# Patient Record
Sex: FEMALE | Race: WHITE | NOT HISPANIC OR LATINO | Employment: OTHER | URBAN - METROPOLITAN AREA
[De-identification: names, ages, dates, MRNs, and addresses within clinical notes are randomized per-mention and may not be internally consistent; named-entity substitution may affect disease eponyms.]

---

## 2017-04-16 ENCOUNTER — HOSPITAL ENCOUNTER (EMERGENCY)
Facility: MEDICAL CENTER | Age: 47
End: 2017-04-16
Attending: EMERGENCY MEDICINE
Payer: MEDICARE

## 2017-04-16 VITALS
BODY MASS INDEX: 29.95 KG/M2 | TEMPERATURE: 97.3 F | RESPIRATION RATE: 14 BRPM | OXYGEN SATURATION: 96 % | WEIGHT: 169 LBS | HEART RATE: 87 BPM | DIASTOLIC BLOOD PRESSURE: 60 MMHG | SYSTOLIC BLOOD PRESSURE: 114 MMHG | HEIGHT: 63 IN

## 2017-04-16 DIAGNOSIS — K04.7 DENTAL INFECTION: ICD-10-CM

## 2017-04-16 PROCEDURE — A9270 NON-COVERED ITEM OR SERVICE: HCPCS | Performed by: EMERGENCY MEDICINE

## 2017-04-16 PROCEDURE — 700102 HCHG RX REV CODE 250 W/ 637 OVERRIDE(OP): Performed by: EMERGENCY MEDICINE

## 2017-04-16 PROCEDURE — 99283 EMERGENCY DEPT VISIT LOW MDM: CPT

## 2017-04-16 RX ORDER — AMOXICILLIN 500 MG/1
500 TABLET, FILM COATED ORAL 3 TIMES DAILY
Qty: 30 TAB | Refills: 0 | Status: SHIPPED | OUTPATIENT
Start: 2017-04-16 | End: 2017-04-26

## 2017-04-16 RX ORDER — AMOXICILLIN 500 MG/1
500 CAPSULE ORAL ONCE
Status: COMPLETED | OUTPATIENT
Start: 2017-04-16 | End: 2017-04-16

## 2017-04-16 RX ADMIN — AMOXICILLIN 500 MG: 500 CAPSULE ORAL at 12:31

## 2017-04-16 NOTE — ED NOTES
Pt discharged to home. Pt and family understand written and verbal d/c instructions.  Prescription given.  Pt to follow up with [facility].  Pt verbalized understanding.

## 2017-04-16 NOTE — ED AVS SNAPSHOT
Selligy Access Code: Activation code not generated  Current Selligy Status: Active    Whydhart  A secure, online tool to manage your health information     [employer]s Selligy® is a secure, online tool that connects you to your personalized health information from the privacy of your home -- day or night - making it very easy for you to manage your healthcare. Once the activation process is completed, you can even access your medical information using the Selligy molly, which is available for free in the Apple Molly store or Google Play store.     Selligy provides the following levels of access (as shown below):   My Chart Features   [facility] Primary Care Doctor [facility]  Specialists [facility]  Urgent  Care Non-[facility]  Primary Care  Doctor   Email your healthcare team securely and privately 24/7 X X X X   Manage appointments: schedule your next appointment; view details of past/upcoming appointments X      Request prescription refills. X      View recent personal medical records, including lab and immunizations X X X X   View health record, including health history, allergies, medications X X X X   Read reports about your outpatient visits, procedures, consult and ER notes X X X X   See your discharge summary, which is a recap of your hospital and/or ER visit that includes your diagnosis, lab results, and care plan. X X       How to register for Selligy:  1. Go to  [email or web address].  2. Click on the Sign Up Now box, which takes you to the New Member Sign Up page. You will need to provide the following information:  a. Enter your Selligy Access Code exactly as it appears at the top of this page. (You will not need to use this code after you’ve completed the sign-up process. If you do not sign up before the expiration date, you must request a new code.)   b. Enter your date of birth.   c. Enter your home email address.   d. Click Submit, and follow the next screen’s instructions.  3. Create a Selligy ID. This will  be your [employer]ia login ID and cannot be changed, so think of one that is secure and easy to remember.  4. Create a Inporia password. You can change your password at any time.  5. Enter your Password Reset Question and Answer. This can be used at a later time if you forget your password.   6. Enter your e-mail address. This allows you to receive e-mail notifications when new information is available in Inporia.  7. Click Sign Up. You can now view your health information.    For assistance activating your Inporia account, call [phone]

## 2017-04-16 NOTE — DISCHARGE INSTRUCTIONS
Abscessed Tooth  An abscessed tooth is an infection around your tooth. It may be caused by holes or damage to the tooth (cavity) or a dental disease. An abscessed tooth causes mild to very bad pain in and around the tooth. See your dentist right away if you have tooth or gum pain.  HOME CARE  · Take your medicine as told. Finish it even if you start to feel better.  · Do not drive after taking pain medicine.  · Rinse your mouth (gargle) often with salt water (¼ teaspoon salt in 8 ounces of warm water).  · Do not apply heat to the outside of your face.  GET HELP RIGHT AWAY IF:   · You have a temperature by mouth above 102° F (38.9° C), not controlled by medicine.  · You have chills and a very bad headache.  · You have problems breathing or swallowing.  · Your mouth will not open.  · You develop puffiness (swelling) on the neck or around the eye.  · Your pain is not helped by medicine.  · Your pain is getting worse instead of better.  MAKE SURE YOU:   · Understand these instructions.  · Will watch your condition.  · Will get help right away if you are not doing well or get worse.  Document Released: 06/05/2009 Document Revised: 03/11/2013 Document Reviewed: 03/27/2012  [employer]® Patient Information ©2014 [employer], [email or web address].

## 2017-04-16 NOTE — ED PROVIDER NOTES
"ED Provider Note      CHIEF COMPLAINT  Chief Complaint   Patient presents with   • Dental Pain       HPI  Karrie Ann Riedel is a 46 y.o. female who presents with dental pain. Onset was yesterday.   The pain is moderate, gradual onset, and located in the left upper molar with crown.  The pain is worse with chewing.   The patient denies difficulty breathing or swallowing.  Patient states she recently moved to town and is still searching for Bk.  She has associated left-sided facial swelling.      REVIEW OF SYSTEMS    Constitutional: No fever.  Respiratory: No difficulty breathing   Ear nose throat: Dental pain         PAST MEDICAL HISTORY  Past Medical History   Diagnosis Date   • Knee pain    • Chiari malformation    • LBP RADIATING TO BOTH LEGS    • Hand swelling    • Hyponatremia    • Hepatitis A 1984   • Heart burn    • Indigestion    • Dental disorder      PARTIAL UPPER PLATE    • Bronchitis 2011   • Anesthesia      \"Takes long time to wake up\"   • Breath shortness      Since 2011   • Personality disorder      anxiety   • ADHD (attention deficit hyperactivity disorder)    • Bronchitis    • Arthritis      R knee & L hip    • Anxiety    • Depression        FAMILY HISTORY  Family History   Problem Relation Age of Onset   • Psychiatry Mother    • Hypertension Father    • Diabetes Father    • Cancer Maternal Grandfather    • Heart Disease Paternal Grandmother    • Diabetes Paternal Grandfather        SOCIAL HISTORY  Social History     Social History   • Marital Status: Single     Spouse Name: N/A   • Number of Children: N/A   • Years of Education: N/A     Social History Main Topics   • Smoking status: Current Every Day Smoker -- 0.50 packs/day for 19 years     Types: Cigarettes     Last Attempt to Quit: 08/09/2012   • Smokeless tobacco: Former User     Quit date: 01/01/1990      Comment: 2 TO 0.5 PPD  FOR 28 YEARS     • Alcohol Use: No      Comment: QUIT 5 YEARS AGO    • Drug Use: No      Comment: MARIJUANA 1 " "JOINT PER WEEK  last dose 2 days    • Sexual Activity:     Partners: Female     Other Topics Concern   • Not on file     Social History Narrative       SURGICAL HISTORY  Past Surgical History   Procedure Laterality Date   • Ear middle exploration     • Tubal coagulation laparoscopic bilateral     • Knee arthroscopy     • Ankle orif  7/23/2010     Performed by NATHALIE MCCLURE at SURGERY [facility]   • Orthopedic osteotomy  3/24/2011     Performed by PONCHO NORTON at SURGERY [facility] ORS   • Hardware removal ortho       left ankle   • Hardware removal ortho  7/5/2012     Performed by PONCHO NORTON at SURGERY [facility]   • Gastroscopy-endo  8/17/2012     Performed by VEE REDMOND at ENDOSCOPY [facility]       CURRENT MEDICATIONS  Home Medications     **Home medications have not yet been reviewed for this encounter**          ALLERGIES  Allergies   Allergen Reactions   • Aspirin Hives     hives   • Nsaids Hives     Hives-ALL EXCEPT TYLENOL   • Toradol Hives   • Bactrim [Sulfamethoxazole W-Trimethoprim]    • Clindamycin    • Haldol [Haloperidol Lactate]    • Other Environmental      Pollen-hayfever       PHYSICAL EXAM  VITAL SIGNS: /60 mmHg  Pulse 87  Temp(Src) 36.3 °C (97.3 °F)  Resp 14  Ht 1.6 m (5' 2.99\")  Wt 76.658 kg (169 lb)  BMI 29.94 kg/m2  SpO2 96%   Constitutional:  Non-toxic appearance.   HENT: Slight separation of crown from tooth left upper dentition, minimal gingival swelling.  No pustule or purulent drainage.  Posterior pharynx normal  Eyes: Anicteric, no conjunctivitis.     Neurologic: Speech is clear, follows commands, facial expression is symmetrical.  Psychiatric: Affect normal,  Mood normal.   Musculoskeletal: Neck nontender      COURSE & MEDICAL DECISION MAKING  Pertinent Labs & Imaging studies reviewed. (See chart for details)  Patient with dental pain, mild left-sided facial swelling consistent with dental infection.  Plan for amoxicillin.  She " initially requested clindamycin however, after being informed she was allergic to clindamycin, agreed and stated she had not had a problem with amoxicillin the past.  Patient has refused pain medication.  She is provided a dental referral form.    FINAL IMPRESSION  1. Dental infection               Electronically signed by: Christiano Eng, 4/16/2017 4:30 PM

## 2017-04-16 NOTE — ED AVS SNAPSHOT
Home Care Instructions                                                                                                                Karrie Ann Riedel   MRN: [id number]    Department:  [facility], Emergency Dept   Date of Visit:  4/16/2017            [facility], Emergency Dept    [address] [address]    Phone:  [phone]      You were seen by     Christiano Eng M.D.      Your Diagnosis Was     Dental infection     K04.7       These are the medications you received during your hospitalization from 04/16/2017 1150 to 04/16/2017 1251     Date/Time Order Dose Route Action    04/16/2017 1231 amoxicillin (AMOXIL) capsule 500 mg 500 mg Oral Given      Follow-up Information     1. Schedule an appointment as soon as possible for a visit with [facility].    Why:  see a dentist as soon as possible    Contact information    [address] [place] [id number] [phone]      Medication Information     Review all of your home medications and newly ordered medications with your primary doctor and/or pharmacist as soon as possible. Follow medication instructions as directed by your doctor and/or pharmacist.     Please keep your complete medication list with you and share with your physician. Update the information when medications are discontinued, doses are changed, or new medications (including over-the-counter products) are added; and carry medication information at all times in the event of emergency situations.               Medication List      START taking these medications        Instructions    Morning Afternoon Evening Bedtime    Amoxicillin 500 MG Tabs        Take 1 Tab by mouth 3 times a day for 10 days.   Dose:  500 mg                          ASK your doctor about these medications        Instructions    Morning Afternoon Evening Bedtime    albuterol 108 (90 BASE) MCG/ACT Aers inhalation aerosol   Commonly known as:  PROAIR  HFA        Inhale 2 Puffs by mouth every 6 hours as needed for Shortness of Breath.   Dose:  2 Puff                        carisoprodol 350 MG Tabs   Commonly known as:  SOMA        Take 350 mg by mouth 3 times a day.   Dose:  350 mg                        fluoxetine 20 MG Caps   Commonly known as:  PROZAC        Take 1 Cap by mouth every day.   Dose:  20 mg                        lurasidone 40 MG Tabs   Commonly known as:  LATUDA        Take 1 Tab by mouth with dinner.   Dose:  40 mg                        methadone 10 MG Tabs   Commonly known as:  DOLOPHINE        Take 10 mg by mouth 3 times a day.   Dose:  10 mg                             Where to Get Your Medications      You can get these medications from any pharmacy     Bring a paper prescription for each of these medications    - Amoxicillin 500 MG Tabs              Discharge Instructions       Abscessed Tooth  An abscessed tooth is an infection around your tooth. It may be caused by holes or damage to the tooth (cavity) or a dental disease. An abscessed tooth causes mild to very bad pain in and around the tooth. See your dentist right away if you have tooth or gum pain.  HOME CARE  · Take your medicine as told. Finish it even if you start to feel better.  · Do not drive after taking pain medicine.  · Rinse your mouth (gargle) often with salt water (¼ teaspoon salt in 8 ounces of warm water).  · Do not apply heat to the outside of your face.  GET HELP RIGHT AWAY IF:   · You have a temperature by mouth above 102° F (38.9° C), not controlled by medicine.  · You have chills and a very bad headache.  · You have problems breathing or swallowing.  · Your mouth will not open.  · You develop puffiness (swelling) on the neck or around the eye.  · Your pain is not helped by medicine.  · Your pain is getting worse instead of better.  MAKE SURE YOU:   · Understand these instructions.  · Will watch your condition.  · Will get help right away if you are not doing well or  get worse.  Document Released: 06/05/2009 Document Revised: 03/11/2013 Document Reviewed: 03/27/2012  ExitCare® Patient Information ©2014 [employer].            Patient Information     Patient Information    Following emergency treatment: all patient requiring follow-up care must return either to a private physician or a clinic if your condition worsens before you are able to obtain further medical attention, please return to the emergency room.     Billing Information    At [facility], we work to make the billing process streamlined for our patients.  Our Representatives are here to answer any questions you may have regarding your hospital bill.  If you have insurance coverage and have supplied your insurance information to us, we will submit a claim to your insurer on your behalf.  Should you have any questions regarding your bill, we can be reached online or by phone as follows:  Online: You are able pay your bills online or live chat with our representatives about any billing questions you may have. We are here to help Monday - Friday from 8:00am to 7:30pm and 9:00am - 12:00pm on Saturdays.  Please visit [email or web address]  for more information.   Phone:  [phone] or [phone]    Please note that your emergency physician, surgeon, pathologist, radiologist, anesthesiologist, and other specialists are not employed by [facility] and will therefore bill separately for their services.  Please contact them directly for any questions concerning their bills at the numbers below:     Emergency Physician Services:  [phone]  [facility]:  [phone]  [facility]:  [phone]  [facility]:  [phone]    1. Your final bill may vary from the amount quoted upon discharge if all procedures are not complete at that time, or if your doctor has additional procedures of which we are not aware. You will receive an additional  bill if you return to the Emergency Department at [facility] for suture removal regardless of the facility of which the sutures were placed.     2. Please arrange for settlement of this account at the emergency registration.    3. All self-pay accounts are due in full at the time of treatment.  If you are unable to meet this obligation then payment is expected within 4-5 days.     4. If you have had radiology studies (CT, X-ray, Ultrasound, MRI), you have received a preliminary result during your emergency department visit. Please contact the radiology department [phone] to receive a copy of your final result. Please discuss the Final result with your primary physician or with the follow up physician provided.     Crisis Hotline:  [place] Crisis Hotline:  7-235-DWROMAW or 1-703.797.9074  [place] Crisis Hotline:    1-496.673.1977 or 253-430-5432         ED Discharge Follow Up Questions    1. In order to provide you with very good care, we would like to follow up with a phone call in the next few days.  May we have your permission to contact you?     YES /  NO    2. What is the best phone number to call you? (       )_____-__________    3. What is the best time to call you?      Morning  /  Afternoon  /  Evening                   Patient Signature:  ____________________________________________________________    Date:  ____________________________________________________________

## 2017-04-16 NOTE — ED AVS SNAPSHOT
4/16/2017    Karrie Ann Riedel  [address] [place], [place] [address]  [place] [address]    Dear Lauren:    [facility] wants to ensure your discharge home is safe and you or your loved ones have had all of your questions answered regarding your care after you leave the hospital.    Below is a list of resources and contact information should you have any questions regarding your hospital stay, follow-up instructions, or active medical symptoms.    Questions or Concerns Regarding… Contact   Medical Questions Related to Your Discharge  (7 days a week, 8am-5pm) Contact a Nurse Care Coordinator   [phone]   Medical Questions Not Related to Your Discharge  (24 hours a day / 7 days a week)  Contact the Nurse Health Line   [phone]    Medications or Discharge Instructions Refer to your discharge packet   or contact your [facility] Primary Care Provider   [phone]   Follow-up Appointment(s) Schedule your appointment via [employer]   or contact Scheduling [phone]   Billing Review your statement via [employer]  or contact Billing [phone]   Medical Records Review your records via [employer]   or contact Medical Records [phone]     You may receive a telephone call within two days of discharge. This call is to make certain you understand your discharge instructions and have the opportunity to have any questions answered. You can also easily access your medical information, test results and upcoming appointments via the [employer] free online health management tool. You can learn more and sign up at [email or web address]. For assistance setting up your [employer] account, please call [phone].    Once again, we want to ensure your discharge home is safe and that you have a clear understanding of any next steps in your care. If you have any questions or concerns, please do not hesitate to contact us, we are here for you. Thank you for choosing [facility] for your healthcare needs.    Sincerely,    Your [facility] Healthcare Team

## 2017-04-16 NOTE — ED NOTES
Chief Complaint   Patient presents with   • Dental Pain       Pt ambulatory to rm 59.  Pt c/o left upper facial swelling X 1 day.  Pain X 2 days (6/10).  No drainage.  Pt has an exposed crown and believes food may have gotten stuck in it yesterday..  Chart up for ERP.

## 2017-05-10 ENCOUNTER — HOSPITAL ENCOUNTER (EMERGENCY)
Facility: MEDICAL CENTER | Age: 47
End: 2017-05-10
Attending: EMERGENCY MEDICINE
Payer: MEDICARE

## 2017-05-10 VITALS
WEIGHT: 177.25 LBS | RESPIRATION RATE: 16 BRPM | OXYGEN SATURATION: 100 % | HEART RATE: 98 BPM | HEIGHT: 63 IN | DIASTOLIC BLOOD PRESSURE: 55 MMHG | TEMPERATURE: 98.2 F | BODY MASS INDEX: 31.41 KG/M2 | SYSTOLIC BLOOD PRESSURE: 136 MMHG
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
## 2017-05-10 PROCEDURE — 99282 EMERGENCY DEPT VISIT SF MDM: CPT

## 2017-05-10 RX ORDER — HYDROXYZINE HYDROCHLORIDE 25 MG/1
25 TABLET, FILM COATED ORAL EVERY 6 HOURS PRN
Qty: 30 TAB | Refills: 0 | Status: SHIPPED | OUTPATIENT
Start: 2017-05-10 | End: 2017-05-26

## 2017-05-10 RX ORDER — PERMETHRIN 50 MG/G
CREAM TOPICAL
Qty: 1 TUBE | Refills: 0 | Status: SHIPPED | OUTPATIENT
Start: 2017-05-10 | End: 2017-05-26

## 2017-05-10 ASSESSMENT — PAIN SCALES - GENERAL: PAINLEVEL_OUTOF10: 0

## 2017-05-10 ASSESSMENT — LIFESTYLE VARIABLES: DO YOU DRINK ALCOHOL: NO

## 2017-05-10 NOTE — ED AVS SNAPSHOT
Home Care Instructions                                                                                                                Karrie Ann Riedel   MRN: [id number]    Department:  [facility], Emergency Dept   Date of Visit:  5/10/2017            [facility], Emergency Dept    [address] [address]    Phone:  [phone]      You were seen by     Bennett Medina M.D.      Your Diagnosis Was     Bed bug bite, initial encounter     W57.XXXA       Follow-up Information     1. Schedule an appointment as soon as possible for a visit with DIPTI Gutierrez.    Specialty:  Family Medicine    Why:  for blood pressure management    Contact information    [address]  [address]  [facility] [address] [phone]          2. Follow up with [facility], Emergency Dept.    Specialty:  Emergency Medicine    Why:  If symptoms worsen    Contact information    [address] [facility] [address] [phone]      Medication Information     Review all of your home medications and newly ordered medications with your primary doctor and/or pharmacist as soon as possible. Follow medication instructions as directed by your doctor and/or pharmacist.     Please keep your complete medication list with you and share with your physician. Update the information when medications are discontinued, doses are changed, or new medications (including over-the-counter products) are added; and carry medication information at all times in the event of emergency situations.               Medication List      START taking these medications        Instructions    Morning Afternoon Evening Bedtime    hydrOXYzine 25 MG Tabs   Commonly known as:  ATARAX        Take 1 Tab by mouth every 6 hours as needed for Itching.   Dose:  25 mg                        permethrin 5 % Crea   Commonly known as:  ELIMITE        Apply to entire body once, rinse 12 hours later.                          ASK your doctor about these medications        Instructions    Morning Afternoon Evening Bedtime    albuterol 108 (90 BASE) MCG/ACT Aers inhalation aerosol   Commonly known as:  PROAIR HFA        Inhale 2 Puffs by mouth every 6 hours as needed for Shortness of Breath.   Dose:  2 Puff                        carisoprodol 350 MG Tabs   Commonly known as:  SOMA        Take 350 mg by mouth 3 times a day.   Dose:  350 mg                        fluoxetine 20 MG Caps   Commonly known as:  PROZAC        Take 1 Cap by mouth every day.   Dose:  20 mg                        lurasidone 40 MG Tabs   Commonly known as:  LATUDA        Take 1 Tab by mouth with dinner.   Dose:  40 mg                        methadone 10 MG Tabs   Commonly known as:  DOLOPHINE        Take 10 mg by mouth 3 times a day.   Dose:  10 mg                             Where to Get Your Medications      You can get these medications from any pharmacy     Bring a paper prescription for each of these medications    - hydrOXYzine 25 MG Tabs  - permethrin 5 % Crea              Discharge Instructions       Bedbugs     Bedbugs are tiny bugs that live in and around beds. They stay hidden during the day, and they come out at night and bite. Bedbugs need blood to live and grow.   WHERE ARE BEDBUGS FOUND?   Bedbugs can be found anywhere, whether a place is clean or dirty. They are most often found in places where many people come and go, such as hotels, shelters, dorms, and health care settings. It is also common for them to be found in homes where there are many birds or bats nearby.   WHAT ARE BEDBUG BITES LIKE?   A bedbug bite leaves a small red bump with a darker red dot in the middle. The bump may appear soon after a person is bitten or a day or more later. Bedbug bites usually do not hurt, but they may itch.   Most people do not need treatment for bedbug bites. The bumps usually go away on their own in a few days.   HOW DO I CHECK FOR BEDBUGS?      Bedbugs are reddish-brown, oval, and flat. They range in size from 1 mm to 7 mm and they cannot fly. Look for bedbugs in these places:   On mattresses, bed frames, headboards, and box springs.   On drapes and curtains in bedrooms.   Under carpeting in bedrooms.   Behind electrical outlets.   Behind any wallpaper that is peeling.   Inside luggage.  Also look for black or red spots or stains on or near the bed. Stains can come from bedbugs that have been crushed or from bedbug waste.   WHAT SHOULD I DO IF I FIND BEDBUGS?   When Traveling   If you find bedbugs while traveling, check all of your possessions carefully before you bring them into your home. Consider throwing away anything that has bedbugs on it.   At Home   If you find bedbugs at home, your bedroom may need to be treated by a pest control expert. You may also need to throw away mattresses or luggage. To help keep bedbugs from coming back, consider taking these actions:   Put a plastic cover over your mattress.   Wash your clothes and bedding in water that is hotter than 120°F (48.9°C) and dry them on a hot setting. Bedbugs are killed by high temperatures.   Vacuum often around the bed and in all of the cracks and crevices where the bugs might hide.   Carefully check all used furniture, bedding, or clothes that you bring into your home.   Eliminate bird nests and bat roosts that are near your home.  In Your Bed   If you find bedbugs in your bed, consider wearing pajamas that have long sleeves and pant legs. Bedbugs usually bite areas of the skin that are not covered.   This information is not intended to replace advice given to you by your health care provider. Make sure you discuss any questions you have with your health care provider.   Document Released: 01/20/2012 Document Revised: 05/03/2016 Document Reviewed: 12/14/2015   Elsevier Interactive Patient Education ©2016 [employer]vier Inc[employer]       Discharge References/Attachments     BEDBUGS (ENGLISH)             Patient Information     Patient Information    Following emergency treatment: all patient requiring follow-up care must return either to a private physician or a clinic if your condition worsens before you are able to obtain further medical attention, please return to the emergency room.     Billing Information    At [facility], we work to make the billing process streamlined for our patients.  Our Representatives are here to answer any questions you may have regarding your hospital bill.  If you have insurance coverage and have supplied your insurance information to us, we will submit a claim to your insurer on your behalf.  Should you have any questions regarding your bill, we can be reached online or by phone as follows:  Online: You are able pay your bills online or live chat with our representatives about any billing questions you may have. We are here to help Monday - Friday from 8:00am to 7:30pm and 9:00am - 12:00pm on Saturdays.  Please visit [email or web address]  for more information.   Phone:  [phone] or [phone]    Please note that your emergency physician, surgeon, pathologist, radiologist, anesthesiologist, and other specialists are not employed by [facility] and will therefore bill separately for their services.  Please contact them directly for any questions concerning their bills at the numbers below:     Emergency Physician Services:  [phone]  [facility]:  [phone]  [facility]:  [phone]  [facility]:  [phone]    1. Your final bill may vary from the amount quoted upon discharge if all procedures are not complete at that time, or if your doctor has additional procedures of which we are not aware. You will receive an additional bill if you return to the Emergency Department at [facility] for suture removal regardless of the facility of which the sutures were placed.     2. Please  arrange for settlement of this account at the emergency registration.    3. All self-pay accounts are due in full at the time of treatment.  If you are unable to meet this obligation then payment is expected within 4-5 days.     4. If you have had radiology studies (CT, X-ray, Ultrasound, MRI), you have received a preliminary result during your emergency department visit. Please contact the radiology department [phone] to receive a copy of your final result. Please discuss the Final result with your primary physician or with the follow up physician provided.     Crisis Hotline:  [place] Crisis Hotline:  8-454-SWFOMMK or 1-997.166.4103  [place] Crisis Hotline:    1-544.495.7622 or 873-429-0340         ED Discharge Follow Up Questions    1. In order to provide you with very good care, we would like to follow up with a phone call in the next few days.  May we have your permission to contact you?     YES /  NO    2. What is the best phone number to call you? (       )_____-__________    3. What is the best time to call you?      Morning  /  Afternoon  /  Evening                   Patient Signature:  ____________________________________________________________    Date:  ____________________________________________________________

## 2017-05-10 NOTE — ED AVS SNAPSHOT
5/10/2017    Karrie Ann Riedel  [address] [place], [place] [address]  [place] [address]    Dear Lauren:    [facility] wants to ensure your discharge home is safe and you or your loved ones have had all of your questions answered regarding your care after you leave the hospital.    Below is a list of resources and contact information should you have any questions regarding your hospital stay, follow-up instructions, or active medical symptoms.    Questions or Concerns Regarding… Contact   Medical Questions Related to Your Discharge  (7 days a week, 8am-5pm) Contact a Nurse Care Coordinator   [phone]   Medical Questions Not Related to Your Discharge  (24 hours a day / 7 days a week)  Contact the Nurse Health Line   [phone]    Medications or Discharge Instructions Refer to your discharge packet   or contact your [facility] Primary Care Provider   [phone]   Follow-up Appointment(s) Schedule your appointment via [facility]   or contact Scheduling [phone]   Billing Review your statement via [facility]  or contact Billing [phone]   Medical Records Review your records via [facility]   or contact Medical Records [phone]     You may receive a telephone call within two days of discharge. This call is to make certain you understand your discharge instructions and have the opportunity to have any questions answered. You can also easily access your medical information, test results and upcoming appointments via the [facility] free online health management tool. You can learn more and sign up at [employer]. For assistance setting up your [facility] account, please call [phone].    Once again, we want to ensure your discharge home is safe and that you have a clear understanding of any next steps in your care. If you have any questions or concerns, please do not hesitate to contact us, we are here for you. Thank you for choosing [facility] for your healthcare needs.    Sincerely,    Your [facility] Healthcare Team

## 2017-05-10 NOTE — ED AVS SNAPSHOT
Web Designed Rooms Access Code: Activation code not generated  Current Web Designed Rooms Status: Active    [employer]  A secure, online tool to manage your health information     [employer]’s Web Designed Rooms® is a secure, online tool that connects you to your personalized health information from the privacy of your home -- day or night - making it very easy for you to manage your healthcare. Once the activation process is completed, you can even access your medical information using the Web Designed Rooms molly, which is available for free in the Apple Molly store or Google Play store.     Web Designed Rooms provides the following levels of access (as shown below):   My Chart Features   [facility] Primary Care Doctor [facility]  Specialists [facility]  Urgent  Care Non-[facility]  Primary Care  Doctor   Email your healthcare team securely and privately 24/7 X X X X   Manage appointments: schedule your next appointment; view details of past/upcoming appointments X      Request prescription refills. X      View recent personal medical records, including lab and immunizations X X X X   View health record, including health history, allergies, medications X X X X   Read reports about your outpatient visits, procedures, consult and ER notes X X X X   See your discharge summary, which is a recap of your hospital and/or ER visit that includes your diagnosis, lab results, and care plan. X X       How to register for Web Designed Rooms:  1. Go to  [email or web address].  2. Click on the Sign Up Now box, which takes you to the New Member Sign Up page. You will need to provide the following information:  a. Enter your Web Designed Rooms Access Code exactly as it appears at the top of this page. (You will not need to use this code after you’ve completed the sign-up process. If you do not sign up before the expiration date, you must request a new code.)   b. Enter your date of birth.   c. Enter your home email address.   d. Click Submit, and follow the next screen’s instructions.  3. Create a Web Designed Rooms ID. This will  be your [employer] login ID and cannot be changed, so think of one that is secure and easy to remember.  4. Create a [employer] password. You can change your password at any time.  5. Enter your Password Reset Question and Answer. This can be used at a later time if you forget your password.   6. Enter your e-mail address. This allows you to receive e-mail notifications when new information is available in [employer].  7. Click Sign Up. You can now view your health information.    For assistance activating your [employer] account, call [phone]

## 2017-05-11 ENCOUNTER — HOSPITAL ENCOUNTER (EMERGENCY)
Facility: MEDICAL CENTER | Age: 47
End: 2017-05-11
Attending: EMERGENCY MEDICINE
Payer: MEDICARE

## 2017-05-11 VITALS
RESPIRATION RATE: 16 BRPM | OXYGEN SATURATION: 95 % | BODY MASS INDEX: 31.54 KG/M2 | SYSTOLIC BLOOD PRESSURE: 104 MMHG | HEIGHT: 63 IN | DIASTOLIC BLOOD PRESSURE: 60 MMHG | TEMPERATURE: 97.9 F | HEART RATE: 64 BPM | WEIGHT: 178 LBS

## 2017-05-11 DIAGNOSIS — S61.411A LACERATION OF RIGHT HAND WITHOUT FOREIGN BODY, INITIAL ENCOUNTER: ICD-10-CM

## 2017-05-11 PROCEDURE — 304217 HCHG IRRIGATION SYSTEM

## 2017-05-11 PROCEDURE — 99283 EMERGENCY DEPT VISIT LOW MDM: CPT

## 2017-05-11 PROCEDURE — 304999 HCHG REPAIR-SIMPLE/INTERMED LEVEL 1

## 2017-05-11 PROCEDURE — 700101 HCHG RX REV CODE 250

## 2017-05-11 PROCEDURE — A6403 STERILE GAUZE>16 <= 48 SQ IN: HCPCS

## 2017-05-11 PROCEDURE — 303747 HCHG EXTRA SUTURE

## 2017-05-11 RX ORDER — LIDOCAINE HYDROCHLORIDE AND EPINEPHRINE 10; 10 MG/ML; UG/ML
5 INJECTION, SOLUTION INFILTRATION; PERINEURAL ONCE
Status: COMPLETED | OUTPATIENT
Start: 2017-05-11 | End: 2017-05-11

## 2017-05-11 RX ORDER — LIDOCAINE HYDROCHLORIDE AND EPINEPHRINE 10; 10 MG/ML; UG/ML
INJECTION, SOLUTION INFILTRATION; PERINEURAL
Status: COMPLETED
Start: 2017-05-11 | End: 2017-05-11

## 2017-05-11 RX ADMIN — LIDOCAINE HYDROCHLORIDE AND EPINEPHRINE 5 ML: 10; 10 INJECTION, SOLUTION INFILTRATION; PERINEURAL at 09:45

## 2017-05-11 RX ADMIN — LIDOCAINE HYDROCHLORIDE,EPINEPHRINE BITARTRATE 5 ML: 10; .01 INJECTION, SOLUTION INFILTRATION; PERINEURAL at 09:45

## 2017-05-11 ASSESSMENT — LIFESTYLE VARIABLES: DO YOU DRINK ALCOHOL: NO

## 2017-05-11 ASSESSMENT — PAIN SCALES - GENERAL: PAINLEVEL_OUTOF10: 4

## 2017-05-11 NOTE — ED NOTES
Verbalizes understanding of discharge and followup instructions. Given Rx's x2. Ambulates with steady gait to discharge.

## 2017-05-11 NOTE — ED AVS SNAPSHOT
5/11/2017    Karrie Ann Riedel  [place] [address]    Dear Lauren:    [facility] wants to ensure your discharge home is safe and you or your loved ones have had all of your questions answered regarding your care after you leave the hospital.    Below is a list of resources and contact information should you have any questions regarding your hospital stay, follow-up instructions, or active medical symptoms.    Questions or Concerns Regarding… Contact   Medical Questions Related to Your Discharge  (7 days a week, 8am-5pm) Contact a Nurse Care Coordinator   [phone]   Medical Questions Not Related to Your Discharge  (24 hours a day / 7 days a week)  Contact the Nurse Health Line   [phone]    Medications or Discharge Instructions Refer to your discharge packet   or contact your [facility] Primary Care Provider   [phone]   Follow-up Appointment(s) Schedule your appointment via [employer]   or contact Scheduling [phone]   Billing Review your statement via [employer]  or contact Billing [phone]   Medical Records Review your records via [employer]   or contact Medical Records [phone]     You may receive a telephone call within two days of discharge. This call is to make certain you understand your discharge instructions and have the opportunity to have any questions answered. You can also easily access your medical information, test results and upcoming appointments via the [employer] free online health management tool. You can learn more and sign up at [employer]. For assistance setting up your [employer] account, please call [phone].    Once again, we want to ensure your discharge home is safe and that you have a clear understanding of any next steps in your care. If you have any questions or concerns, please do not hesitate to contact us, we are here for you. Thank you for choosing [facility] for your healthcare needs.    Sincerely,    Your [facility] Healthcare Team

## 2017-05-11 NOTE — ED AVS SNAPSHOT
Home Care Instructions                                                                                                                Karrie Ann Riedel   MRN: [id number]    Department:  [facility], Emergency Dept   Date of Visit:  5/11/2017            [facility], Emergency Dept    [address] [place] [id number]    Phone:  [phone]      You were seen by     Rajwinder Hightower M.D.      Your Diagnosis Was     Laceration of right hand without foreign body, initial encounter     S61.411A       These are the medications you received during your hospitalization from 05/11/2017 0931 to 05/11/2017 1035     Date/Time Order Dose Route Action    05/11/2017 0945 lidocaine-epinephrine 1 %-1:110430 injection 5 mL 5 mL Infiltration Given      Follow-up Information     1. Follow up with DIPTI Gutierrez.    Specialty:  Family Medicine    Why:  For suture removal in 7-10 days    Contact information    [address]  [address]  [facility] [address] [phone]          2. Follow up with [facility], Emergency Dept.    Specialty:  Emergency Medicine    Why:  Return for worsening pain, redness, swelling, fever or other concerns    Contact information    [address] [facility] [address] [phone]      Medication Information     Review all of your home medications and newly ordered medications with your primary doctor and/or pharmacist as soon as possible. Follow medication instructions as directed by your doctor and/or pharmacist.     Please keep your complete medication list with you and share with your physician. Update the information when medications are discontinued, doses are changed, or new medications (including over-the-counter products) are added; and carry medication information at all times in the event of emergency situations.               Medication List      ASK your doctor about these medications        Instructions    Morning Afternoon  Evening Bedtime    albuterol 108 (90 BASE) MCG/ACT Aers inhalation aerosol   Commonly known as:  PROAIR HFA        Inhale 2 Puffs by mouth every 6 hours as needed for Shortness of Breath.   Dose:  2 Puff                        carisoprodol 350 MG Tabs   Commonly known as:  SOMA        Take 350 mg by mouth 3 times a day.   Dose:  350 mg                        fluoxetine 20 MG Caps   Commonly known as:  PROZAC        Take 1 Cap by mouth every day.   Dose:  20 mg                        hydrOXYzine 25 MG Tabs   Commonly known as:  ATARAX        Take 1 Tab by mouth every 6 hours as needed for Itching.   Dose:  25 mg                        lurasidone 40 MG Tabs   Commonly known as:  LATUDA        Take 1 Tab by mouth with dinner.   Dose:  40 mg                        methadone 10 MG Tabs   Commonly known as:  DOLOPHINE        Take 10 mg by mouth 3 times a day.   Dose:  10 mg                        permethrin 5 % Crea   Commonly known as:  ELIMITE        Apply to entire body once, rinse 12 hours later.                                  Discharge Instructions       Laceration Care, Adult  A laceration is a cut that goes through all layers of the skin. The cut also goes into the tissue that is right under the skin. Some cuts heal on their own. Others need to be closed with stitches (sutures), staples, skin adhesive strips, or wound glue. Taking care of your cut lowers your risk of infection and helps your cut to heal better.  HOW TO TAKE CARE OF YOUR CUT  For stitches or staples:  · Keep the wound clean and dry.  · If you were given a bandage (dressing), you should change it at least one time per day or as told by your doctor. You should also change it if it gets wet or dirty.  · Keep the wound completely dry for the first 24 hours or as told by your doctor. After that time, you may take a shower or a bath. However, make sure that the wound is not soaked in water until after the stitches or staples have been removed.  · Clean  the wound one time each day or as told by your doctor:  ¨ Wash the wound with soap and water.  ¨ Rinse the wound with water until all of the soap comes off.  ¨ Pat the wound dry with a clean towel. Do not rub the wound.  · After you clean the wound, put a thin layer of antibiotic ointment on it as told by your doctor. This ointment:  ¨ Helps to prevent infection.  ¨ Keeps the bandage from sticking to the wound.  · Have your stitches or staples removed as told by your doctor.  If your doctor used skin adhesive strips:   · Keep the wound clean and dry.  · If you were given a bandage, you should change it at least one time per day or as told by your doctor. You should also change it if it gets dirty or wet.  · Do not get the skin adhesive strips wet. You can take a shower or a bath, but be careful to keep the wound dry.  · If the wound gets wet, pat it dry with a clean towel. Do not rub the wound.  · Skin adhesive strips fall off on their own. You can trim the strips as the wound heals. Do not remove any strips that are still stuck to the wound. They will fall off after a while.  If your doctor used wound glue:  · Try to keep your wound dry, but you may briefly wet it in the shower or bath. Do not soak the wound in water, such as by swimming.  · After you take a shower or a bath, gently pat the wound dry with a clean towel. Do not rub the wound.  · Do not do any activities that will make you really sweaty until the skin glue has fallen off on its own.  · Do not apply liquid, cream, or ointment medicine to your wound while the skin glue is still on.  · If you were given a bandage, you should change it at least one time per day or as told by your doctor. You should also change it if it gets dirty or wet.  · If a bandage is placed over the wound, do not let the tape for the bandage touch the skin glue.  · Do not pick at the glue. The skin glue usually stays on for 5-10 days. Then, it falls off of the skin.  General  Instructions   · To help prevent scarring, make sure to cover your wound with sunscreen whenever you are outside after stitches are removed, after adhesive strips are removed, or when wound glue stays in place and the wound is healed. Make sure to wear a sunscreen of at least 30 SPF.  · Take over-the-counter and prescription medicines only as told by your doctor.  · If you were given antibiotic medicine or ointment, take or apply it as told by your doctor. Do not stop using the antibiotic even if your wound is getting better.  · Do not scratch or pick at the wound.  · Keep all follow-up visits as told by your doctor. This is important.  · Check your wound every day for signs of infection. Watch for:  ¨ Redness, swelling, or pain.  ¨ Fluid, blood, or pus.  · Raise (elevate) the injured area above the level of your heart while you are sitting or lying down, if possible.  GET HELP IF:  · You got a tetanus shot and you have any of these problems at the injection site:  ¨ Swelling.  ¨ Very bad pain.  ¨ Redness.  ¨ Bleeding.  · You have a fever.  · A wound that was closed breaks open.  · You notice a bad smell coming from your wound or your bandage.  · You notice something coming out of the wound, such as wood or glass.  · Medicine does not help your pain.  · You have more redness, swelling, or pain at the site of your wound.  · You have fluid, blood, or pus coming from your wound.  · You notice a change in the color of your skin near your wound.  · You need to change the bandage often because fluid, blood, or pus is coming from the wound.  · You start to have a new rash.  · You start to have numbness around the wound.  GET HELP RIGHT AWAY IF:  · You have very bad swelling around the wound.  · Your pain suddenly gets worse and is very bad.  · You notice painful lumps near the wound or on skin that is anywhere on your body.  · You have a red streak going away from your wound.  · The wound is on your hand or foot and you  cannot move a finger or toe like you usually can.  · The wound is on your hand or foot and you notice that your fingers or toes look pale or bluish.     This information is not intended to replace advice given to you by your health care provider. Make sure you discuss any questions you have with your health care provider.     Document Released: 06/05/2009 Document Revised: 05/03/2016 Document Reviewed: 12/14/2015  [employer] Interactive Patient Education ©2016 [employer]            Patient Information     Patient Information    Following emergency treatment: all patient requiring follow-up care must return either to a private physician or a clinic if your condition worsens before you are able to obtain further medical attention, please return to the emergency room.     Billing Information    At [facility], we work to make the billing process streamlined for our patients.  Our Representatives are here to answer any questions you may have regarding your hospital bill.  If you have insurance coverage and have supplied your insurance information to us, we will submit a claim to your insurer on your behalf.  Should you have any questions regarding your bill, we can be reached online or by phone as follows:  Online: You are able pay your bills online or live chat with our representatives about any billing questions you may have. We are here to help Monday - Friday from 8:00am to 7:30pm and 9:00am - 12:00pm on Saturdays.  Please visit [email or web address]  for more information.   Phone:  [phone] or [phone]    Please note that your emergency physician, surgeon, pathologist, radiologist, anesthesiologist, and other specialists are not employed by [facility] and will therefore bill separately for their services.  Please contact them directly for any questions concerning their bills at the numbers below:     Emergency Physician Services:  [phone]  [facility]:  [phone]  [facility]:  [phone]  [facility]:  [phone]    1. Your final bill may vary from the amount quoted upon discharge if all procedures are not complete at that time, or if your doctor has additional procedures of which we are not aware. You will receive an additional bill if you return to the Emergency Department at [facility] for suture removal regardless of the facility of which the sutures were placed.     2. Please arrange for settlement of this account at the emergency registration.    3. All self-pay accounts are due in full at the time of treatment.  If you are unable to meet this obligation then payment is expected within 4-5 days.     4. If you have had radiology studies (CT, X-ray, Ultrasound, MRI), you have received a preliminary result during your emergency department visit. Please contact the radiology department [phone] to receive a copy of your final result. Please discuss the Final result with your primary physician or with the follow up physician provided.     Crisis Hotline:  [place] Crisis Hotline:  4-751-CQGAVTX or 1-244.930.5315  [place] Crisis Hotline:    1-848.771.4484 or 063-791-0203         ED Discharge Follow Up Questions    1. In order to provide you with very good care, we would like to follow up with a phone call in the next few days.  May we have your permission to contact you?     YES /  NO    2. What is the best phone number to call you? (       )_____-__________    3. What is the best time to call you?      Morning  /  Afternoon  /  Evening                   Patient Signature:  ____________________________________________________________    Date:  ____________________________________________________________

## 2017-05-11 NOTE — ED NOTES
Provided patient with discharge paperwork and verbal education on suture care. Patient verbalized understanding of follow up and home care.

## 2017-05-11 NOTE — ED NOTES
Chief Complaint   Patient presents with   • Hand Laceration     Patient to ED via EMS. EMS reports that patient has a full thickness 4cm laceration to her right hand caused by a knife while she was trying to cut something in the kitchen. Bleeding controlled by EMS.

## 2017-05-11 NOTE — ED NOTES
Patient reports that she was here yesterday for bed bugs, but that she has on clean clothes today.

## 2017-05-11 NOTE — ED NOTES
Karrie Ann Riedel  46 y.o.  Chief Complaint   Patient presents with   • Bug Bite     generalized, red itchy and swollen, + bedbugs in house     Ambulatory to triage with above complaint. Patient state that she has tried to get rid of the bedbugs at home without success.

## 2017-05-11 NOTE — ED PROVIDER NOTES
ED Provider Note    Scribed for Rajwinder Hightower M.D. by Lance Frankel. 5/11/2017, 9:39 AM.    Primary Care Provider: [facility] Gutierrez  Means of arrival: EMS  History obtained from: patient  History limited by: none    CHIEF COMPLAINT  Chief Complaint   Patient presents with   • Hand Laceration     Patient to ED via EMS. EMS reports that patient has a full thickness 4cm laceration to her right hand caused by a knife while she was trying to cut something in the kitchen. Bleeding controlled by EMS.        HPI  Karrie Ann Riedel is a 47 y.o. female who presents to the Emergency Department with right hand laceration sustained just prior to arrival. Patient states that she was opening a package of food with an old knife when the knife slipped, cutting into her hand. Her last tentanus was 1.5 years ago. She denies loss of sensation    REVIEW OF SYSTEMS  Pertinent positives include laceration. Pertinent negatives include no loss of sensation.    E.    PAST MEDICAL HISTORY   has a past medical history of Knee pain; Chiari malformation; LBP RADIATING TO BOTH LEGS; Hand swelling; Hyponatremia; Hepatitis A (1984); Heart burn; Indigestion; Dental disorder; Bronchitis (2011); Anesthesia; Breath shortness; Personality disorder; ADHD (attention deficit hyperactivity disorder); Bronchitis; Arthritis; Anxiety; and Depression.    SOCIAL HISTORY  Social History   Substance Use Topics   • Smoking status: Current Every Day Smoker -- 0.50 packs/day for 19 years     Types: Cigarettes     Last Attempt to Quit: 08/09/2012   • Smokeless tobacco: Former User     Quit date: 01/01/1990      Comment: 2 TO 0.5 PPD  FOR 28 YEARS     • Alcohol Use: No      Comment: QUIT 5 YEARS AGO       History   Drug Use   • Yes   • Special: Marijuana     Comment: MARIJUANA 1 JOINT PER WEEK  last dose 2 days        SURGICAL HISTORY   has past surgical history that includes ear middle exploration; tubal coagulation laparoscopic bilateral; knee  "arthroscopy; ankle orif (7/23/2010); orthopedic osteotomy (3/24/2011); hardware removal ortho; hardware removal ortho (7/5/2012); and gastroscopy-endo (8/17/2012).     CURRENT MEDICATIONS  Home Medications     Reviewed by Michelle Dumas R.N. (Registered Nurse) on 05/11/17 at 0935  Med List Status: Not Addressed    Medication Last Dose Status    albuterol (PROAIR HFA) 108 (90 BASE) MCG/ACT Aero Soln inhalation aerosol not taking Active    carisoprodol (SOMA) 350 MG TABS not taking Active    fluoxetine (PROZAC) 20 MG Cap not taking Active    hydrOXYzine (ATARAX) 25 MG Tab  Active    lurasidone (LATUDA) 40 MG Tab not taking Active    methadone (DOLOPHINE) 10 MG TABS  Active    permethrin (ELIMITE) 5 % Cream  Active                ALLERGIES  Allergies   Allergen Reactions   • Aspirin Hives     hives   • Nsaids Hives     Hives-ALL EXCEPT TYLENOL   • Toradol Hives   • Bactrim [Sulfamethoxazole W-Trimethoprim]    • Clindamycin    • Haldol [Haloperidol Lactate]    • Other Environmental      Pollen-hayfever       PHYSICAL EXAM  VITAL SIGNS: /60 mmHg  Pulse 64  Temp(Src) 36.6 °C (97.9 °F)  Resp 16  Ht 1.6 m (5' 2.99\")  Wt 80.74 kg (178 lb)  BMI 31.54 kg/m2  SpO2 95%  Constitutional: Alert in no apparent distress. Well apearing  HENT: Normocephalic, Atraumatic, Bilateral external ears normal. Nose normal.   Eyes:  Conjunctiva normal, non-icteric.   Lungs: Non-labored respirations  Skin: Warm, Dry, No rash. 3 cm, full thickness laceration over the adductor pollicis of the right hand.   Neurologic: Alert, Grossly non-focal.  Neurovascularly intact.   Psychiatric: Affect odd    Laceration Repair Procedure Note    Indication: Laceration    Procedure: The patient was placed in the appropriate position and anesthesia around the laceration was obtained by infiltration using 1% Lidocaine with epinephrine. The area was then irrigated with normal saline. The laceration was closed with 3-0 Ethilon using interrupted " sutures. There were no additional lacerations requiring repair. The wound area was then dressed with a sterile dressing.      Total repaired wound length: 3 cm.     Other Items: Suture count: 7    The patient tolerated the procedure well.     Complications: None      COURSE & MEDICAL DECISION MAKING  Pertinent Labs & Imaging studies reviewed. (See chart for details)    9:39 AM - Patient seen and examined at bedside. Patient will be treated with Lidocaine-epi 5 ml.    9:44 AM - Administered lidocaine injection.    10:15 AM - Laceration repair procedure performed. Repeat exam showed the patient is neurologically intact. Gave wound care precautions to the patient an instructed her to return to the ED with any new or worsening symptoms. Patient verbalizes understanding.       The patient will return for new or worsening symptoms and is stable at the time of discharge. Patient was given return precautions. Patient verbalizes understanding and will comply.    DISPOSITION:  Patient will be discharged home in stable condition.    FOLLOW UP:  MONTY GutierrezPTiffanieNTiffanie  [address] [facility] [address]  [facility] [address]  [phone]      For suture removal in 7-10 days    [facility], Emergency Dept  [facility] [facility] [address]  [phone]    Return for worsening pain, redness, swelling, fever or other concerns      OUTPATIENT MEDICATIONS:  Discharge Medication List as of 5/11/2017 10:35 AM              FINAL IMPRESSION  Laceration repair procedure.   1. Laceration of right hand without foreign body, initial encounter         This dictation has been created using voice recognition software and/or scribes. The accuracy of the dictation is limited by the abilities of the software and the expertise of the scribes. I expect there may be some errors of grammar and possibly content. I made every attempt to manually correct the errors within my dictation. However, errors related to voice recognition software  and/or scribes may still exist and should be interpreted within the appropriate context.     ILance (Scribe), am scribing for, and in the presence of, Rajwinder Hightower M.D..    Electronically signed by: Lance Frankel (Scribe), 5/11/2017    Rajwinder WRIGHT M.D. personally performed the services described in this documentation, as scribed by Lance Frankel in my presence, and it is both accurate and complete.    The note accurately reflects work and decisions made by me.  Rajwinder Hightower  5/11/2017  12:51 PM

## 2017-05-11 NOTE — DISCHARGE INSTRUCTIONS
Laceration Care, Adult  A laceration is a cut that goes through all layers of the skin. The cut also goes into the tissue that is right under the skin. Some cuts heal on their own. Others need to be closed with stitches (sutures), staples, skin adhesive strips, or wound glue. Taking care of your cut lowers your risk of infection and helps your cut to heal better.  HOW TO TAKE CARE OF YOUR CUT  For stitches or staples:  · Keep the wound clean and dry.  · If you were given a bandage (dressing), you should change it at least one time per day or as told by your doctor. You should also change it if it gets wet or dirty.  · Keep the wound completely dry for the first 24 hours or as told by your doctor. After that time, you may take a shower or a bath. However, make sure that the wound is not soaked in water until after the stitches or staples have been removed.  · Clean the wound one time each day or as told by your doctor:  ¨ Wash the wound with soap and water.  ¨ Rinse the wound with water until all of the soap comes off.  ¨ Pat the wound dry with a clean towel. Do not rub the wound.  · After you clean the wound, put a thin layer of antibiotic ointment on it as told by your doctor. This ointment:  ¨ Helps to prevent infection.  ¨ Keeps the bandage from sticking to the wound.  · Have your stitches or staples removed as told by your doctor.  If your doctor used skin adhesive strips:   · Keep the wound clean and dry.  · If you were given a bandage, you should change it at least one time per day or as told by your doctor. You should also change it if it gets dirty or wet.  · Do not get the skin adhesive strips wet. You can take a shower or a bath, but be careful to keep the wound dry.  · If the wound gets wet, pat it dry with a clean towel. Do not rub the wound.  · Skin adhesive strips fall off on their own. You can trim the strips as the wound heals. Do not remove any strips that are still stuck to the wound. They will  fall off after a while.  If your doctor used wound glue:  · Try to keep your wound dry, but you may briefly wet it in the shower or bath. Do not soak the wound in water, such as by swimming.  · After you take a shower or a bath, gently pat the wound dry with a clean towel. Do not rub the wound.  · Do not do any activities that will make you really sweaty until the skin glue has fallen off on its own.  · Do not apply liquid, cream, or ointment medicine to your wound while the skin glue is still on.  · If you were given a bandage, you should change it at least one time per day or as told by your doctor. You should also change it if it gets dirty or wet.  · If a bandage is placed over the wound, do not let the tape for the bandage touch the skin glue.  · Do not pick at the glue. The skin glue usually stays on for 5-10 days. Then, it falls off of the skin.  General Instructions   · To help prevent scarring, make sure to cover your wound with sunscreen whenever you are outside after stitches are removed, after adhesive strips are removed, or when wound glue stays in place and the wound is healed. Make sure to wear a sunscreen of at least 30 SPF.  · Take over-the-counter and prescription medicines only as told by your doctor.  · If you were given antibiotic medicine or ointment, take or apply it as told by your doctor. Do not stop using the antibiotic even if your wound is getting better.  · Do not scratch or pick at the wound.  · Keep all follow-up visits as told by your doctor. This is important.  · Check your wound every day for signs of infection. Watch for:  ¨ Redness, swelling, or pain.  ¨ Fluid, blood, or pus.  · Raise (elevate) the injured area above the level of your heart while you are sitting or lying down, if possible.  GET HELP IF:  · You got a tetanus shot and you have any of these problems at the injection site:  ¨ Swelling.  ¨ Very bad pain.  ¨ Redness.  ¨ Bleeding.  · You have a fever.  · A wound that was  closed breaks open.  · You notice a bad smell coming from your wound or your bandage.  · You notice something coming out of the wound, such as wood or glass.  · Medicine does not help your pain.  · You have more redness, swelling, or pain at the site of your wound.  · You have fluid, blood, or pus coming from your wound.  · You notice a change in the color of your skin near your wound.  · You need to change the bandage often because fluid, blood, or pus is coming from the wound.  · You start to have a new rash.  · You start to have numbness around the wound.  GET HELP RIGHT AWAY IF:  · You have very bad swelling around the wound.  · Your pain suddenly gets worse and is very bad.  · You notice painful lumps near the wound or on skin that is anywhere on your body.  · You have a red streak going away from your wound.  · The wound is on your hand or foot and you cannot move a finger or toe like you usually can.  · The wound is on your hand or foot and you notice that your fingers or toes look pale or bluish.     This information is not intended to replace advice given to you by your health care provider. Make sure you discuss any questions you have with your health care provider.     Document Released: 06/05/2009 Document Revised: 05/03/2016 Document Reviewed: 12/14/2015  [employer] Interactive Patient Education ©2016 [employer]

## 2017-05-11 NOTE — ED PROVIDER NOTES
ED Provider Note    Scribed for Bennett Medina M.D. by Keysha Ojeda. 5/10/2017, 11:11 PM.    Primary care provider: DIPTI Gutierrez  Means of arrival: Walk-in  History obtained from: Patient  History limited by: None    CHIEF COMPLAINT  Chief Complaint   Patient presents with   • Bug Bite     generalized, red itchy and swollen, + bedbugs in house     HPI  Karrie Ann Riedel is a 46 y.o. female who presents to the Emergency Department with bug bites onset several weeks ago. The patient states that she lives in a boarding house and has contacted her landlord repeatedly in an attempt to get an , but he will not acknowledge it. She reports that she has bites all over her body and has not been able to sleep as a result. The patient has attempted to resolve the problem by spraying alcohol on her belongings and skin. Patient has no fevers no chills no nausea no vomiting no other complaints at this time.    REVIEW OF SYSTEMS  Positive for multiple insect bites and pruritus negative for fevers chills nausea vomiting    PAST MEDICAL HISTORY   has a past medical history of Knee pain; Chiari malformation; LBP RADIATING TO BOTH LEGS; Hand swelling; Hyponatremia; Hepatitis A (1984); Heart burn; Indigestion; Dental disorder; Bronchitis (2011); Anesthesia; Breath shortness; Personality disorder; ADHD (attention deficit hyperactivity disorder); Bronchitis; Arthritis; Anxiety; and Depression.    SURGICAL HISTORY   has past surgical history that includes ear middle exploration; tubal coagulation laparoscopic bilateral; knee arthroscopy; ankle orif (7/23/2010); orthopedic osteotomy (3/24/2011); hardware removal ortho; hardware removal ortho (7/5/2012); and gastroscopy-endo (8/17/2012).    SOCIAL HISTORY  Social History   Substance Use Topics   • Smoking status: Current Every Day Smoker -- 0.50 packs/day for 19 years     Types: Cigarettes     Last Attempt to Quit: 08/09/2012   • Smokeless tobacco: Former User      "Quit date: 01/01/1990      Comment: 2 TO 0.5 PPD  FOR 28 YEARS     • Alcohol Use: No      Comment: QUIT 5 YEARS AGO       History   Drug Use   • Yes   • Special: Marijuana     Comment: MARIJUANA 1 JOINT PER WEEK  last dose 2 days        FAMILY HISTORY  Non-contributory    CURRENT MEDICATIONS  Home Medications     Reviewed by Antwan Bojorquez R.N. (Registered Nurse) on 05/10/17 at 2303  Med List Status: Partial    Medication Last Dose Status    albuterol (PROAIR HFA) 108 (90 BASE) MCG/ACT Aero Soln inhalation aerosol not taking Active    carisoprodol (SOMA) 350 MG TABS not taking Active    fluoxetine (PROZAC) 20 MG Cap not taking Active    lurasidone (LATUDA) 40 MG Tab not taking Active    methadone (DOLOPHINE) 10 MG TABS  Active              ALLERGIES  Allergies   Allergen Reactions   • Aspirin Hives     hives   • Nsaids Hives     Hives-ALL EXCEPT TYLENOL   • Toradol Hives   • Bactrim [Sulfamethoxazole W-Trimethoprim]    • Clindamycin    • Haldol [Haloperidol Lactate]    • Other Environmental      Pollen-hayfever       PHYSICAL EXAM  VITAL SIGNS: /55 mmHg  Pulse 98  Temp(Src) 36.8 °C (98.2 °F)  Resp 16  Ht 1.6 m (5' 3\")  Wt 80.4 kg (177 lb 4 oz)  BMI 31.41 kg/m2  SpO2 100%  Constitutional: Alert and oriented x 3, Anxious appearing.  HENT: Normocephalic, Atraumatic, Bilateral external ears normal. Nose normal.   Eyes: Pupils are equal and reactive. Conjunctiva normal, non-icteric.   Heart: Regular rate and rythm, no murmurs, equal pulses peripherally x 4.    Lungs: Clear to auscultation bilaterally, no wheezes rales or rhonchi  GI: Soft nontender nondistended positive bowel sounds.  Skin: Multiple erythematous slightly raised lesions with small areas of skin breakdown center consistent with insect bite they do seem to be grouped in clusters and in areas of clothing lines/skinfolds  Neurologic: Cranial nerves III through XII grossly intact no sensory deficit no cerebellar dysfunction.   Psychiatric: " "Appropriate affect for situation, anxious appearing.     COURSE & MEDICAL DECISION MAKING  Nursing notes, VS, PMSFHx reviewed in chart.    11:11 PM - Patient seen and examined at bedside.We discussed further plan of care including medication and discharge. The patient understands and verbalizes agreement. /55 mmHg  Pulse 98  Temp(Src) 36.8 °C (98.2 °F)  Resp 16  Ht 1.6 m (5' 3\")  Wt 80.4 kg (177 lb 4 oz)  BMI 31.41 kg/m2  SpO2 100%       Medical Decision Making: Patient with multiple insect bites that do look highly suspicious for a bed bug bites. No areas of erythema induration or warmth concerning for cellulitis patient was placed on Atarax given prescription for Elimite instructed to have to her house at the next available time return for any worsening symptoms or concerns.    The patient will return for new or worsening symptoms and is stable at the time of discharge.    The patient is referred to a primary physician for blood pressure management, diabetic screening, and for all other preventative health concerns.    DISPOSITION:  Patient will be discharged home in stable condition.    FOLLOW UP:  MONTY GutierrezPKELVIN  [address] [place] [id number] [phone]    Schedule an appointment as soon as possible for a visit      [facility], Emergency Dept  [address] [facility] [address] [phone]    If symptoms worsen      OUTPATIENT MEDICATIONS:  New Prescriptions    HYDROXYZINE (ATARAX) 25 MG TAB    Take 1 Tab by mouth every 6 hours as needed for Itching.    PERMETHRIN (ELIMITE) 5 % CREAM    Apply to entire body once, rinse 12 hours later.     FINAL IMPRESSION  1. Bed bug bite, initial encounter         This dictation has been created using voice recognition software and/or scribes. The accuracy of the dictation is limited by the abilities of the software and the expertise of the scribes. I expect there may be some errors of grammar and possibly " content. I made every attempt to manually correct the errors within my dictation. However, errors related to voice recognition software and/or scribes may still exist and should be interpreted within the appropriate context.     I, Keysha Ojeda (Scribe), am scribing for, and in the presence of, Bennett Medina M.D..    Electronically signed by: Keysha jOeda (Ashleyibglenda), 5/10/2017    IBennett M.D. personally performed the services described in this documentation, as scribed by Keysha Ojeda in my presence, and it is both accurate and complete.    The note accurately reflects work and decisions made by me.  Bennett Medina  5/11/2017  6:45 AM

## 2017-05-11 NOTE — DISCHARGE INSTRUCTIONS
Bedbugs     Bedbugs are tiny bugs that live in and around beds. They stay hidden during the day, and they come out at night and bite. Bedbugs need blood to live and grow.   WHERE ARE BEDBUGS FOUND?   Bedbugs can be found anywhere, whether a place is clean or dirty. They are most often found in places where many people come and go, such as hotels, shelters, dorms, and health care settings. It is also common for them to be found in homes where there are many birds or bats nearby.   WHAT ARE BEDBUG BITES LIKE?   A bedbug bite leaves a small red bump with a darker red dot in the middle. The bump may appear soon after a person is bitten or a day or more later. Bedbug bites usually do not hurt, but they may itch.   Most people do not need treatment for bedbug bites. The bumps usually go away on their own in a few days.   HOW DO I CHECK FOR BEDBUGS?   Bedbugs are reddish-brown, oval, and flat. They range in size from 1 mm to 7 mm and they cannot fly. Look for bedbugs in these places:   On mattresses, bed frames, headboards, and box springs.   On drapes and curtains in bedrooms.   Under carpeting in bedrooms.   Behind electrical outlets.   Behind any wallpaper that is peeling.   Inside luggage.  Also look for black or red spots or stains on or near the bed. Stains can come from bedbugs that have been crushed or from bedbug waste.   WHAT SHOULD I DO IF I FIND BEDBUGS?   When Traveling   If you find bedbugs while traveling, check all of your possessions carefully before you bring them into your home. Consider throwing away anything that has bedbugs on it.   At Home   If you find bedbugs at home, your bedroom may need to be treated by a pest control expert. You may also need to throw away mattresses or luggage. To help keep bedbugs from coming back, consider taking these actions:   Put a plastic cover over your mattress.   Wash your clothes and bedding in water that is hotter than 120°F (48.9°C) and dry them on a hot setting.  Bedbugs are killed by high temperatures.   Vacuum often around the bed and in all of the cracks and crevices where the bugs might hide.   Carefully check all used furniture, bedding, or clothes that you bring into your home.   Eliminate bird nests and bat roosts that are near your home.  In Your Bed   If you find bedbugs in your bed, consider wearing pajamas that have long sleeves and pant legs. Bedbugs usually bite areas of the skin that are not covered.   This information is not intended to replace advice given to you by your health care provider. Make sure you discuss any questions you have with your health care provider.   Document Released: 01/20/2012 Document Revised: 05/03/2016 Document Reviewed: 12/14/2015   Elsevier Interactive Patient Education ©2016 [employer].

## 2017-05-11 NOTE — ED AVS SNAPSHOT
admetricks Access Code: Activation code not generated  Current admetricks Status: Active    iPawnhart  A secure, online tool to manage your health information     [employer]’s admetricks® is a secure, online tool that connects you to your personalized health information from the privacy of your home -- day or night - making it very easy for you to manage your healthcare. Once the activation process is completed, you can even access your medical information using the admetricks molly, which is available for free in the Apple Molly store or Google Play store.     admetricks provides the following levels of access (as shown below):   My Chart Features   [facility] Primary Care Doctor [facility]  Specialists [facility]  Urgent  Care Non-[facility]  Primary Care  Doctor   Email your healthcare team securely and privately 24/7 X X X X   Manage appointments: schedule your next appointment; view details of past/upcoming appointments X      Request prescription refills. X      View recent personal medical records, including lab and immunizations X X X X   View health record, including health history, allergies, medications X X X X   Read reports about your outpatient visits, procedures, consult and ER notes X X X X   See your discharge summary, which is a recap of your hospital and/or ER visit that includes your diagnosis, lab results, and care plan. X X       How to register for admetricks:  1. Go to  [email or web address].  2. Click on the Sign Up Now box, which takes you to the New Member Sign Up page. You will need to provide the following information:  a. Enter your admetricks Access Code exactly as it appears at the top of this page. (You will not need to use this code after you’ve completed the sign-up process. If you do not sign up before the expiration date, you must request a new code.)   b. Enter your date of birth.   c. Enter your home email address.   d. Click Submit, and follow the next screen’s instructions.  3. Create a admetricks ID. This will  be your [employer] login ID and cannot be changed, so think of one that is secure and easy to remember.  4. Create a [employer] password. You can change your password at any time.  5. Enter your Password Reset Question and Answer. This can be used at a later time if you forget your password.   6. Enter your e-mail address. This allows you to receive e-mail notifications when new information is available in [employer].  7. Click Sign Up. You can now view your health information.    For assistance activating your [employer] account, call [phone]

## 2017-05-15 ENCOUNTER — HOSPITAL ENCOUNTER (EMERGENCY)
Facility: MEDICAL CENTER | Age: 47
End: 2017-05-15
Payer: MEDICARE

## 2017-05-15 VITALS
SYSTOLIC BLOOD PRESSURE: 132 MMHG | TEMPERATURE: 98.5 F | HEIGHT: 63 IN | DIASTOLIC BLOOD PRESSURE: 77 MMHG | OXYGEN SATURATION: 99 % | HEART RATE: 90 BPM | RESPIRATION RATE: 16 BRPM

## 2017-05-15 PROCEDURE — 302449 STATCHG TRIAGE ONLY (STATISTIC)

## 2017-05-15 ASSESSMENT — PAIN SCALES - GENERAL: PAINLEVEL_OUTOF10: 6

## 2017-05-18 ENCOUNTER — HOSPITAL ENCOUNTER (EMERGENCY)
Facility: MEDICAL CENTER | Age: 47
End: 2017-05-18
Payer: MEDICARE

## 2017-05-18 VITALS
OXYGEN SATURATION: 100 % | SYSTOLIC BLOOD PRESSURE: 135 MMHG | DIASTOLIC BLOOD PRESSURE: 71 MMHG | RESPIRATION RATE: 14 BRPM | TEMPERATURE: 98.2 F | HEART RATE: 55 BPM

## 2017-05-18 PROCEDURE — 99281 EMR DPT VST MAYX REQ PHY/QHP: CPT

## 2017-05-26 ENCOUNTER — HOSPITAL ENCOUNTER (OUTPATIENT)
Facility: MEDICAL CENTER | Age: 47
End: 2017-05-27
Attending: EMERGENCY MEDICINE | Admitting: INTERNAL MEDICINE
Payer: MEDICARE

## 2017-05-26 ENCOUNTER — RESOLUTE PROFESSIONAL BILLING HOSPITAL PROF FEE (OUTPATIENT)
Dept: HOSPITALIST | Facility: MEDICAL CENTER | Age: 47
End: 2017-05-26
Payer: MEDICARE

## 2017-05-26 ENCOUNTER — APPOINTMENT (OUTPATIENT)
Dept: RADIOLOGY | Facility: MEDICAL CENTER | Age: 47
End: 2017-05-26
Attending: INTERNAL MEDICINE
Payer: MEDICARE

## 2017-05-26 DIAGNOSIS — S66.229A LACERATION OF EXTENSOR MUSCLE AND TENDON OF THUMB AT WRIST AND HAND LEVEL: ICD-10-CM

## 2017-05-26 DIAGNOSIS — R19.7 VOMITING AND DIARRHEA: ICD-10-CM

## 2017-05-26 DIAGNOSIS — R11.10 VOMITING AND DIARRHEA: ICD-10-CM

## 2017-05-26 PROBLEM — R11.2 NAUSEA & VOMITING: Status: ACTIVE | Noted: 2017-05-26

## 2017-05-26 LAB
ALBUMIN SERPL BCP-MCNC: 3.5 G/DL (ref 3.2–4.9)
ALBUMIN/GLOB SERPL: 1 G/DL
ALP SERPL-CCNC: 47 U/L (ref 30–99)
ALT SERPL-CCNC: 8 U/L (ref 2–50)
ANION GAP SERPL CALC-SCNC: 11 MMOL/L (ref 0–11.9)
AST SERPL-CCNC: 15 U/L (ref 12–45)
BASOPHILS # BLD AUTO: 0.1 % (ref 0–1.8)
BASOPHILS # BLD: 0.01 K/UL (ref 0–0.12)
BILIRUB SERPL-MCNC: 0.3 MG/DL (ref 0.1–1.5)
BUN SERPL-MCNC: 16 MG/DL (ref 8–22)
CALCIUM SERPL-MCNC: 8.4 MG/DL (ref 8.5–10.5)
CHLORIDE SERPL-SCNC: 109 MMOL/L (ref 96–112)
CO2 SERPL-SCNC: 12 MMOL/L (ref 20–33)
CREAT SERPL-MCNC: 0.73 MG/DL (ref 0.5–1.4)
EOSINOPHIL # BLD AUTO: 0.02 K/UL (ref 0–0.51)
EOSINOPHIL NFR BLD: 0.2 % (ref 0–6.9)
ERYTHROCYTE [DISTWIDTH] IN BLOOD BY AUTOMATED COUNT: 44.8 FL (ref 35.9–50)
GFR SERPL CREATININE-BSD FRML MDRD: >60 ML/MIN/1.73 M 2
GLOBULIN SER CALC-MCNC: 3.6 G/DL (ref 1.9–3.5)
GLUCOSE SERPL-MCNC: 117 MG/DL (ref 65–99)
HCG SERPL QL: NEGATIVE
HCT VFR BLD AUTO: 39.1 % (ref 37–47)
HGB BLD-MCNC: 12.6 G/DL (ref 12–16)
IMM GRANULOCYTES # BLD AUTO: 0.02 K/UL (ref 0–0.11)
IMM GRANULOCYTES NFR BLD AUTO: 0.2 % (ref 0–0.9)
LIPASE SERPL-CCNC: 5 U/L (ref 11–82)
LYMPHOCYTES # BLD AUTO: 1.07 K/UL (ref 1–4.8)
LYMPHOCYTES NFR BLD: 11.5 % (ref 22–41)
MCH RBC QN AUTO: 31.3 PG (ref 27–33)
MCHC RBC AUTO-ENTMCNC: 32.2 G/DL (ref 33.6–35)
MCV RBC AUTO: 97.3 FL (ref 81.4–97.8)
MONOCYTES # BLD AUTO: 0.56 K/UL (ref 0–0.85)
MONOCYTES NFR BLD AUTO: 6 % (ref 0–13.4)
NEUTROPHILS # BLD AUTO: 7.62 K/UL (ref 2–7.15)
NEUTROPHILS NFR BLD: 82 % (ref 44–72)
NRBC # BLD AUTO: 0 K/UL
NRBC BLD AUTO-RTO: 0 /100 WBC
PLATELET # BLD AUTO: 226 K/UL (ref 164–446)
PMV BLD AUTO: 11.3 FL (ref 9–12.9)
POTASSIUM SERPL-SCNC: 4 MMOL/L (ref 3.6–5.5)
PROT SERPL-MCNC: 7.1 G/DL (ref 6–8.2)
RBC # BLD AUTO: 4.02 M/UL (ref 4.2–5.4)
SODIUM SERPL-SCNC: 132 MMOL/L (ref 135–145)
WBC # BLD AUTO: 9.3 K/UL (ref 4.8–10.8)

## 2017-05-26 PROCEDURE — 700102 HCHG RX REV CODE 250 W/ 637 OVERRIDE(OP): Performed by: INTERNAL MEDICINE

## 2017-05-26 PROCEDURE — 99220 PR INITIAL OBSERVATION CARE,LEVL III: CPT | Mod: 25 | Performed by: INTERNAL MEDICINE

## 2017-05-26 PROCEDURE — 74000 DX-ABDOMEN-1 VIEW: CPT

## 2017-05-26 PROCEDURE — 93005 ELECTROCARDIOGRAM TRACING: CPT | Performed by: EMERGENCY MEDICINE

## 2017-05-26 PROCEDURE — 83690 ASSAY OF LIPASE: CPT

## 2017-05-26 PROCEDURE — G0378 HOSPITAL OBSERVATION PER HR: HCPCS

## 2017-05-26 PROCEDURE — 84703 CHORIONIC GONADOTROPIN ASSAY: CPT

## 2017-05-26 PROCEDURE — 99407 BEHAV CHNG SMOKING > 10 MIN: CPT | Performed by: INTERNAL MEDICINE

## 2017-05-26 PROCEDURE — 96361 HYDRATE IV INFUSION ADD-ON: CPT

## 2017-05-26 PROCEDURE — 85025 COMPLETE CBC W/AUTO DIFF WBC: CPT

## 2017-05-26 PROCEDURE — 96374 THER/PROPH/DIAG INJ IV PUSH: CPT

## 2017-05-26 PROCEDURE — 700111 HCHG RX REV CODE 636 W/ 250 OVERRIDE (IP): Performed by: INTERNAL MEDICINE

## 2017-05-26 PROCEDURE — 87493 C DIFF AMPLIFIED PROBE: CPT

## 2017-05-26 PROCEDURE — A9270 NON-COVERED ITEM OR SERVICE: HCPCS | Performed by: INTERNAL MEDICINE

## 2017-05-26 PROCEDURE — 99285 EMERGENCY DEPT VISIT HI MDM: CPT

## 2017-05-26 PROCEDURE — 96372 THER/PROPH/DIAG INJ SC/IM: CPT

## 2017-05-26 PROCEDURE — 96375 TX/PRO/DX INJ NEW DRUG ADDON: CPT

## 2017-05-26 PROCEDURE — 87324 CLOSTRIDIUM AG IA: CPT

## 2017-05-26 PROCEDURE — 700111 HCHG RX REV CODE 636 W/ 250 OVERRIDE (IP): Performed by: EMERGENCY MEDICINE

## 2017-05-26 PROCEDURE — 700105 HCHG RX REV CODE 258: Performed by: INTERNAL MEDICINE

## 2017-05-26 PROCEDURE — 700105 HCHG RX REV CODE 258: Performed by: EMERGENCY MEDICINE

## 2017-05-26 PROCEDURE — 80053 COMPREHEN METABOLIC PANEL: CPT

## 2017-05-26 RX ORDER — SODIUM CHLORIDE 9 MG/ML
INJECTION, SOLUTION INTRAVENOUS CONTINUOUS
Status: DISCONTINUED | OUTPATIENT
Start: 2017-05-26 | End: 2017-05-27 | Stop reason: HOSPADM

## 2017-05-26 RX ORDER — ONDANSETRON 4 MG/1
4 TABLET, ORALLY DISINTEGRATING ORAL EVERY 4 HOURS PRN
Status: DISCONTINUED | OUTPATIENT
Start: 2017-05-26 | End: 2017-05-27 | Stop reason: HOSPADM

## 2017-05-26 RX ORDER — AMOXICILLIN 500 MG/1
500 CAPSULE ORAL 3 TIMES DAILY
Status: ON HOLD | COMMUNITY
End: 2017-05-27

## 2017-05-26 RX ORDER — PROMETHAZINE HYDROCHLORIDE 25 MG/1
12.5-25 TABLET ORAL EVERY 4 HOURS PRN
Status: DISCONTINUED | OUTPATIENT
Start: 2017-05-26 | End: 2017-05-27 | Stop reason: HOSPADM

## 2017-05-26 RX ORDER — NICOTINE 21 MG/24HR
21 PATCH, TRANSDERMAL 24 HOURS TRANSDERMAL
Status: DISCONTINUED | OUTPATIENT
Start: 2017-05-26 | End: 2017-05-27 | Stop reason: HOSPADM

## 2017-05-26 RX ORDER — DIPHENHYDRAMINE HCL 25 MG
50 TABLET ORAL NIGHTLY PRN
COMMUNITY

## 2017-05-26 RX ORDER — HEPARIN SODIUM 5000 [USP'U]/ML
5000 INJECTION, SOLUTION INTRAVENOUS; SUBCUTANEOUS EVERY 8 HOURS
Status: DISCONTINUED | OUTPATIENT
Start: 2017-05-26 | End: 2017-05-27 | Stop reason: HOSPADM

## 2017-05-26 RX ORDER — ACETAMINOPHEN 500 MG
1000 TABLET ORAL EVERY 6 HOURS PRN
COMMUNITY

## 2017-05-26 RX ORDER — ONDANSETRON 2 MG/ML
4 INJECTION INTRAMUSCULAR; INTRAVENOUS ONCE
Status: COMPLETED | OUTPATIENT
Start: 2017-05-26 | End: 2017-05-26

## 2017-05-26 RX ORDER — ACETAMINOPHEN 325 MG/1
650 TABLET ORAL EVERY 6 HOURS PRN
Status: DISCONTINUED | OUTPATIENT
Start: 2017-05-26 | End: 2017-05-27 | Stop reason: HOSPADM

## 2017-05-26 RX ORDER — PROMETHAZINE HYDROCHLORIDE 12.5 MG/1
12.5-25 SUPPOSITORY RECTAL EVERY 4 HOURS PRN
Status: DISCONTINUED | OUTPATIENT
Start: 2017-05-26 | End: 2017-05-27 | Stop reason: HOSPADM

## 2017-05-26 RX ORDER — SODIUM CHLORIDE 9 MG/ML
1000 INJECTION, SOLUTION INTRAVENOUS ONCE
Status: COMPLETED | OUTPATIENT
Start: 2017-05-26 | End: 2017-05-26

## 2017-05-26 RX ORDER — ONDANSETRON 2 MG/ML
4 INJECTION INTRAMUSCULAR; INTRAVENOUS EVERY 4 HOURS PRN
Status: DISCONTINUED | OUTPATIENT
Start: 2017-05-26 | End: 2017-05-27 | Stop reason: HOSPADM

## 2017-05-26 RX ADMIN — HEPARIN SODIUM 5000 UNITS: 5000 INJECTION, SOLUTION INTRAVENOUS; SUBCUTANEOUS at 21:05

## 2017-05-26 RX ADMIN — SODIUM CHLORIDE 1000 ML: 9 INJECTION, SOLUTION INTRAVENOUS at 15:20

## 2017-05-26 RX ADMIN — SODIUM CHLORIDE: 9 INJECTION, SOLUTION INTRAVENOUS at 21:05

## 2017-05-26 RX ADMIN — ACETAMINOPHEN 650 MG: 325 TABLET, FILM COATED ORAL at 19:59

## 2017-05-26 RX ADMIN — ONDANSETRON 4 MG: 2 INJECTION INTRAMUSCULAR; INTRAVENOUS at 15:20

## 2017-05-26 RX ADMIN — NICOTINE 21 MG: 21 PATCH, EXTENDED RELEASE TRANSDERMAL at 22:20

## 2017-05-26 ASSESSMENT — PAIN SCALES - GENERAL
PAINLEVEL_OUTOF10: 7
PAINLEVEL_OUTOF10: 6
PAINLEVEL_OUTOF10: 5

## 2017-05-26 ASSESSMENT — LIFESTYLE VARIABLES
ALCOHOL_USE: NO
EVER_SMOKED: YES

## 2017-05-26 NOTE — IP AVS SNAPSHOT
" Home Care Instructions                                                                                                                  Name:Karrie Ann Riedel  Medical Record Number:[id number]  CSN: [id number]    YOB: 1970   Age: 47 y.o.  Sex: female  HT:1.6 m (5' 2.99\") WT: 81.647 kg (180 lb)          Admit Date: 5/26/2017     Discharge Date:   Today's Date: 5/27/2017  Attending Doctor:  Albert House M.D.                  Allergies:  Aspirin; Nsaids; Toradol; Bactrim; Clindamycin; Haldol; and Other environmental            Discharge Instructions       Discharge Instructions    Discharged to home by car with relative. Discharged via walking, hospital escort: Yes.  Special equipment needed: Not Applicable    Be sure to schedule a follow-up appointment with your primary care doctor or any specialists as instructed.     Discharge Plan:   Diet Plan: Discussed  Activity Level: Discussed  Smoking Cessation Offered: Patient Refused  Confirmed Follow up Appointment: Patient to Call and Schedule Appointment  Confirmed Symptoms Management: Discussed  Medication Reconciliation Updated: Yes  Influenza Vaccine Indication: Patient Refuses, Indicated: Not available from distributor/    I understand that a diet low in cholesterol, fat, and sodium is recommended for good health. Unless I have been given specific instructions below for another diet, I accept this instruction as my diet prescription.   Other diet: Regular Diet    Special Instructions: None    · Is patient discharged on Warfarin / Coumadin?   No     · Is patient Post Blood Transfusion?  No    Depression / Suicide Risk    As you are discharged from this [facility] facility, it is important to learn how to keep safe from harming yourself.    Recognize the warning signs:  · Abrupt changes in personality, positive or negative- including increase in energy   · Giving away possessions  · Change in eating patterns- significant weight changes-  positive " or negative  · Change in sleeping patterns- unable to sleep or sleeping all the time   · Unwillingness or inability to communicate  · Depression  · Unusual sadness, discouragement and loneliness  · Talk of wanting to die  · Neglect of personal appearance   · Rebelliousness- reckless behavior  · Withdrawal from people/activities they love  · Confusion- inability to concentrate     If you or a loved one observes any of these behaviors or has concerns about self-harm, here's what you can do:  · Talk about it- your feelings and reasons for harming yourself  · Remove any means that you might use to hurt yourself (examples: pills, rope, extension cords, firearm)  · Get professional help from the community (Mental Health, Substance Abuse, psychological counseling)  · Do not be alone:Call your Safe Contact- someone whom you trust who will be there for you.  · Call your local CRISIS HOTLINE 264-4727 or 315-835-9919  · Call your local Children's Mobile Crisis Response Team [place] (356) 651-4023 or www.Mostro  · Call the toll free National Suicide Prevention Hotlines   · National Suicide Prevention Lifeline 697-766-BLNV (8946)  · National Hope Line Network 800-SUICIDE (621-9097)        Follow-up Information     1. Schedule an appointment as soon as possible for a visit with DIPTI Gutierrez.    Specialty:  Family Medicine    Contact information    [address] [address]  [place] [address] [phone]           Discharge Medication Instructions:    Below are the medications your physician expects you to take upon discharge:    Review all your home medications and newly ordered medications with your doctor and/or pharmacist. Follow medication instructions as directed by your doctor and/or pharmacist.    Please keep your medication list with you and share with your physician.               Medication List      START taking these medications        Instructions    Morning Afternoon Evening Bedtime     loperamide 2 MG Caps   Last time this was given:  2 mg on 5/27/2017 12:24 PM   Commonly known as:  IMODIUM        Take 1 Cap by mouth 4 times a day as needed for Diarrhea.   Dose:  2 mg                        nicotine 21 MG/24HR Pt24   Last time this was given:  21 mg on 5/26/2017 10:20 PM   Commonly known as:  NICODERM        Apply 1 Patch to skin as directed every 24 hours.   Dose:  1 Patch                        ondansetron 4 MG Tbdp   Commonly known as:  ZOFRAN ODT        Take 1 Tab by mouth every 8 hours as needed for Nausea/Vomiting.   Dose:  4 mg                          CONTINUE taking these medications        Instructions    Morning Afternoon Evening Bedtime    acetaminophen 500 MG Tabs   Last time this was given:  650 mg on 5/27/2017  6:08 AM   Commonly known as:  TYLENOL        Take 1,000 mg by mouth every 6 hours as needed for Moderate Pain.   Dose:  1000 mg                        diphenhydrAMINE 25 MG Tabs   Commonly known as:  BENADRYL        Take 50 mg by mouth at bedtime as needed for Sleep.   Dose:  50 mg                          STOP taking these medications     amoxicillin 500 MG Caps   Commonly known as:  AMOXIL                    Where to Get Your Medications      These medications were sent to [id number] - [place] - [address] [place] [address] AT [address]  [address] [place] [address]     Phone:  [phone]    - loperamide 2 MG Caps  - nicotine 21 MG/24HR Pt24  - ondansetron 4 MG Tbdp            Instructions           Diet / Nutrition:    Follow any diet instructions given to you by your doctor or the dietician, including how much salt (sodium) you are allowed each day.    If you are overweight, talk to your doctor about a weight reduction plan.    Activity:    Remain physically active following your doctor's instructions about exercise and activity.    Rest often.     Any time you become even a little tired or short of breath, SIT DOWN and rest.    Worsening  Symptoms:    Report any of the following signs and symptoms to the doctor's office immediately:    *Pain of jaw, arm, or neck  *Chest pain not relieved by medication                               *Dizziness or loss of consciousness  *Difficulty breathing even when at rest   *More tired than usual                                       *Bleeding drainage or swelling of surgical site  *Swelling of feet, ankles, legs or stomach                 *Fever (>100ºF)  *Pink or blood tinged sputum  *Weight gain (3lbs/day or 5lbs /week)           *Shock from internal defibrillator (if applicable)  *Palpitations or irregular heartbeats                *Cool and/or numb extremities    Stroke Awareness    Common Risk Factors for Stroke include:    Age  Atrial Fibrillation  Carotid Artery Stenosis  Diabetes Mellitus  Excessive alcohol consumption  High blood pressure  Overweight   Physical inactivity  Smoking    Warning signs and symptoms of a stroke include:    *Sudden numbness or weakness of the face, arm or leg (especially on one side of the body).  *Sudden confusion, trouble speaking or understanding.  *Sudden trouble seeing in one or both eyes.  *Sudden trouble walking, dizziness, loss of balance or coordination.Sudden severe headache with no known cause.    It is very important to get treatment quickly when a stroke occurs. If you experience any of the above warning signs, call 911 immediately.                   Disclaimer         Quit Smoking / Tobacco Use:    I understand the use of any tobacco products increases my chance of suffering from future heart disease or stroke and could cause other illnesses which may shorten my life. Quitting the use of tobacco products is the single most important thing I can do to improve my health. For further information on smoking / tobacco cessation call a Toll Free Quit Line at 1-882.890.2669 (*National Cancer [place]) or 1-307.774.5829 (American Lung Association) or you can access the web  based program at www.lungusa.org.    [place] Tobacco Users Help Line:  (375) 767-2489       Toll Free: 1-360.804.5724  Quit Tobacco Program [facility] (097)176-7599    Crisis Hotline:    [place] Crisis Hotline:  6-752-EOJHDJE or 1-353.103.9795    [place] Crisis Hotline:    1-880.133.3542 or 091-578-0858    Discharge Survey:   Thank you for choosing [facility]. We hope we did everything we could to make your hospital stay a pleasant one. You may be receiving a phone survey and we would appreciate your time and participation in answering the questions. Your input is very valuable to us in our efforts to improve our service to our patients and their families.        My signature on this form indicates that:    1. I have reviewed and understand the above information.  2. My questions regarding this information have been answered to my satisfaction.  3. I have formulated a plan with my discharge nurse to obtain my prescribed medications for home.                  Disclaimer         __________________________________                     __________       ________                       Patient Signature                                                 Date                    Time

## 2017-05-26 NOTE — ED NOTES
"46 y/o female bib ambulance with c/o n/v/d x 3 days, generalized weakness that began yesterday and an episode of dizziness and \"fluttering\" in her chest that occurred earlier in the day. The fluttering has resolved.   "

## 2017-05-26 NOTE — ED PROVIDER NOTES
"ED Provider Note    CHIEF COMPLAINT  Chief Complaint   Patient presents with   • Dizziness   • Nausea/Vomiting/Diarrhea       HPI  Karrie Ann Riedel is a 47 y.o. female who presents with dizziness. The patient started with some diarrhea 2 days ago. She describes copious watery diarrhea. Beginning yesterday she started having some associated vomiting. She states that she's been eating enough and does not that she been drinking enough. Today she was feeling dizzy, does not describe near-syncope however. It is since resolved. She thinks that she is dehydrated and that laying down helped it. To me denies any palpitations. Denies any syncope. She denies any rash. When asked about her laceration from a few weeks ago, she states that it has healed up fine but she cannot move her thumb. She has not followed with anyone for this. She was on some antibiotics last month for tooth infection. That has gotten better. She denies any blood in the stools. Denies any sick contacts or suspicious foods. There is no other complaint.    PAST MEDICAL HISTORY  Past Medical History   Diagnosis Date   • Knee pain    • Chiari malformation    • LBP RADIATING TO BOTH LEGS    • Hand swelling    • Hyponatremia    • Hepatitis A 1984   • Heart burn    • Indigestion    • Dental disorder      PARTIAL UPPER PLATE    • Bronchitis 2011   • Anesthesia      \"Takes long time to wake up\"   • Breath shortness      Since 2011   • Personality disorder      anxiety   • ADHD (attention deficit hyperactivity disorder)    • Bronchitis    • Arthritis      R knee & L hip    • Anxiety    • Depression        FAMILY HISTORY  Family History   Problem Relation Age of Onset   • Psychiatry Mother    • Hypertension Father    • Diabetes Father    • Cancer Maternal Grandfather    • Heart Disease Paternal Grandmother    • Diabetes Paternal Grandfather        SOCIAL HISTORY  Social History   Substance Use Topics   • Smoking status: Current Every Day Smoker -- 0.50 packs/day for " "19 years     Types: Cigarettes     Last Attempt to Quit: 08/09/2012   • Smokeless tobacco: Former User     Quit date: 01/01/1990      Comment: 2 TO 0.5 PPD  FOR 28 YEARS     • Alcohol Use: No      Comment: QUIT 5 YEARS AGO          SURGICAL HISTORY  Past Surgical History   Procedure Laterality Date   • Ear middle exploration     • Tubal coagulation laparoscopic bilateral     • Knee arthroscopy     • Ankle orif  7/23/2010     Performed by NATHALIE MCCLURE at [facility]   • Orthopedic osteotomy  3/24/2011     Performed by PONCHO NORTON at SURGERY [facility]   • Hardware removal ortho       left ankle   • Hardware removal ortho  7/5/2012     Performed by PONCHO NORTON at SURGERY [facility]   • Gastroscopy-endo  8/17/2012     Performed by VEE REDMOND at ENDOSCOPY [facility]       CURRENT MEDICATIONS    I have reviewed the nurses notes and/or the list brought with the patient.    ALLERGIES  Allergies   Allergen Reactions   • Aspirin Hives     hives   • Nsaids Hives     Hives-ALL EXCEPT TYLENOL   • Toradol Hives   • Bactrim [Sulfamethoxazole W-Trimethoprim]    • Clindamycin    • Haldol [Haloperidol Lactate]    • Other Environmental      Pollen-hayfever       REVIEW OF SYSTEMS  See HPI for further details. Review of systems as above, otherwise all other systems are negative.     PHYSICAL EXAM  VITAL SIGNS: /54 mmHg  Pulse 42  Temp(Src) 36.4 °C (97.6 °F)  Resp 18  Ht 1.6 m (5' 2.99\")  Wt 81.647 kg (180 lb)  BMI 31.89 kg/m2  Constitutional: Well appearing patient in no acute distress.  Not toxic, nor ill in appearance.  HENT: Mucus membranes moist.  Oropharynx is clear.  Eyes: Pupils equally round.  No scleral icterus.   Neck: Full nontender range of motion.  Lymphatic: No cervical lymphadenopathy noted.   Cardiovascular: Regular heart rate and rhythm, 50s to 60s on the monitor.  No murmurs, rubs, nor gallop appreciated.   Thorax & Lungs: Chest is nontender.  Lungs are " clear to auscultation with good air movement bilaterally.  No wheeze, rhonchi, nor rales.   Abdomen: Soft, with no tenderness, rebound nor guarding.  No mass, pulsatile mass, nor hepatosplenomegaly appreciated.  Skin: No purpura nor petechia noted.  Extremities/Musculoskeletal: She is unable to extend her thumb on the right. She has a healed laceration over the thumb metacarpal.  Neurologic: Alert & oriented.  Strength and sensation is intact all around.  Gait is normal.  Psychiatric: Normal affect appropriate for the clinical situation.    EKG  I interpreted this EKG myself.  This is a 12-lead study.  The rhythm is sinus bradycardia with a rate of 41.  There are no ST segment nor T wave abnormalities.  Interpretation: No ST segment elevation myocardial infarction.    LABS  Labs Reviewed   CBC WITH DIFFERENTIAL - Abnormal; Notable for the following:     RBC 4.02 (*)     MCHC 32.2 (*)     Neutrophils-Polys 82.00 (*)     Lymphocytes 11.50 (*)     Neutrophils (Absolute) 7.62 (*)     All other components within normal limits   COMP METABOLIC PANEL - Abnormal; Notable for the following:     Sodium 132 (*)     Co2 12 (*)     Glucose 117 (*)     Calcium 8.4 (*)     Globulin 3.6 (*)     All other components within normal limits   LIPASE - Abnormal; Notable for the following:     Lipase 5 (*)     All other components within normal limits   HCG QUAL SERUM   ESTIMATED GFR   CBC WITH DIFFERENTIAL   CDIFF BY PCR WITH TOXIN       MEDICAL RECORD  I have reviewed patient's medical record and pertinent results are listed above.    COURSE & MEDICAL DECISION MAKING  I have reviewed any medical record information, laboratory studies and radiographic results as noted above.  Patient presents with a fair bit of vomiting, now diarrhea. She did have relatively recent amoxicillin, and C. difficile considered. However she is unable to provide a specimen here. She's had no further vomiting here but given the depth of her bicarb suppression, I  think that she'll benefit from IV fluids overnight. The patient has benign abdomen. No evidence of pancreatitis or hepatitis. No leukocytosis. She is not pregnant. She is no symptoms to suggest urinary tract infection. I discussed the patient's case with Dr. Hastings, who is seeing the patient. She is admitted.    Regarding the thumb, she'll need to follow up with hand surgery. I suspect that she has a extensor tendon laceration. She'll be placed into a splint, and given follow-up with hand surgery, Dr. Howard.    FINAL IMPRESSION  1. Vomiting and diarrhea    2. Laceration of extensor muscle and tendon of thumb at wrist and hand level           This dictation was created using voice recognition software.    Electronically signed by: Jose Carlos العراقي, 5/26/2017 3:25 PM

## 2017-05-27 VITALS
HEART RATE: 60 BPM | HEIGHT: 63 IN | RESPIRATION RATE: 18 BRPM | DIASTOLIC BLOOD PRESSURE: 67 MMHG | SYSTOLIC BLOOD PRESSURE: 111 MMHG | TEMPERATURE: 98 F | WEIGHT: 180 LBS | BODY MASS INDEX: 31.89 KG/M2 | OXYGEN SATURATION: 99 %

## 2017-05-27 PROBLEM — F17.200 TOBACCO DEPENDENCE: Chronic | Status: ACTIVE | Noted: 2017-05-27

## 2017-05-27 PROBLEM — A08.4 VIRAL GASTROENTERITIS: Status: ACTIVE | Noted: 2017-05-27

## 2017-05-27 LAB
ANION GAP SERPL CALC-SCNC: 6 MMOL/L (ref 0–11.9)
BASOPHILS # BLD AUTO: 0.5 % (ref 0–1.8)
BASOPHILS # BLD: 0.03 K/UL (ref 0–0.12)
BUN SERPL-MCNC: 13 MG/DL (ref 8–22)
C DIFF DNA SPEC QL NAA+PROBE: NEGATIVE
C DIFF TOX A+B STL QL IA: NEGATIVE
C DIFF TOX GENS STL QL NAA+PROBE: NEGATIVE
CALCIUM SERPL-MCNC: 8 MG/DL (ref 8.5–10.5)
CHLORIDE SERPL-SCNC: 110 MMOL/L (ref 96–112)
CO2 SERPL-SCNC: 18 MMOL/L (ref 20–33)
COMMENT 1642: NORMAL
CREAT SERPL-MCNC: 0.63 MG/DL (ref 0.5–1.4)
EOSINOPHIL # BLD AUTO: 0.08 K/UL (ref 0–0.51)
EOSINOPHIL NFR BLD: 1.4 % (ref 0–6.9)
ERYTHROCYTE [DISTWIDTH] IN BLOOD BY AUTOMATED COUNT: 48.7 FL (ref 35.9–50)
GFR SERPL CREATININE-BSD FRML MDRD: >60 ML/MIN/1.73 M 2
GLUCOSE SERPL-MCNC: 90 MG/DL (ref 65–99)
HCT VFR BLD AUTO: 38.5 % (ref 37–47)
HGB BLD-MCNC: 11.5 G/DL (ref 12–16)
IMM GRANULOCYTES # BLD AUTO: 0.02 K/UL (ref 0–0.11)
IMM GRANULOCYTES NFR BLD AUTO: 0.4 % (ref 0–0.9)
LYMPHOCYTES # BLD AUTO: 2.59 K/UL (ref 1–4.8)
LYMPHOCYTES NFR BLD: 46 % (ref 22–41)
MAGNESIUM SERPL-MCNC: 1.7 MG/DL (ref 1.5–2.5)
MCH RBC QN AUTO: 31.3 PG (ref 27–33)
MCHC RBC AUTO-ENTMCNC: 29.9 G/DL (ref 33.6–35)
MCV RBC AUTO: 104.6 FL (ref 81.4–97.8)
MONOCYTES # BLD AUTO: 0.39 K/UL (ref 0–0.85)
MONOCYTES NFR BLD AUTO: 6.9 % (ref 0–13.4)
MORPHOLOGY BLD-IMP: NORMAL
NEUTROPHILS # BLD AUTO: 2.52 K/UL (ref 2–7.15)
NEUTROPHILS NFR BLD: 44.8 % (ref 44–72)
NRBC # BLD AUTO: 0.02 K/UL
NRBC BLD AUTO-RTO: 0.4 /100 WBC
PLATELET # BLD AUTO: 182 K/UL (ref 164–446)
PMV BLD AUTO: 11 FL (ref 9–12.9)
POTASSIUM SERPL-SCNC: 3.8 MMOL/L (ref 3.6–5.5)
RBC # BLD AUTO: 3.68 M/UL (ref 4.2–5.4)
SODIUM SERPL-SCNC: 134 MMOL/L (ref 135–145)
WBC # BLD AUTO: 5.6 K/UL (ref 4.8–10.8)

## 2017-05-27 PROCEDURE — 96361 HYDRATE IV INFUSION ADD-ON: CPT

## 2017-05-27 PROCEDURE — 36415 COLL VENOUS BLD VENIPUNCTURE: CPT

## 2017-05-27 PROCEDURE — 85025 COMPLETE CBC W/AUTO DIFF WBC: CPT

## 2017-05-27 PROCEDURE — 80048 BASIC METABOLIC PNL TOTAL CA: CPT

## 2017-05-27 PROCEDURE — 700111 HCHG RX REV CODE 636 W/ 250 OVERRIDE (IP): Performed by: NURSE PRACTITIONER

## 2017-05-27 PROCEDURE — 96372 THER/PROPH/DIAG INJ SC/IM: CPT

## 2017-05-27 PROCEDURE — 96365 THER/PROPH/DIAG IV INF INIT: CPT

## 2017-05-27 PROCEDURE — G0378 HOSPITAL OBSERVATION PER HR: HCPCS

## 2017-05-27 PROCEDURE — 700111 HCHG RX REV CODE 636 W/ 250 OVERRIDE (IP): Performed by: INTERNAL MEDICINE

## 2017-05-27 PROCEDURE — 99217 PR OBSERVATION CARE DISCHARGE: CPT | Performed by: INTERNAL MEDICINE

## 2017-05-27 PROCEDURE — 700102 HCHG RX REV CODE 250 W/ 637 OVERRIDE(OP): Performed by: NURSE PRACTITIONER

## 2017-05-27 PROCEDURE — A9270 NON-COVERED ITEM OR SERVICE: HCPCS | Performed by: NURSE PRACTITIONER

## 2017-05-27 PROCEDURE — 700102 HCHG RX REV CODE 250 W/ 637 OVERRIDE(OP): Performed by: INTERNAL MEDICINE

## 2017-05-27 PROCEDURE — 700105 HCHG RX REV CODE 258: Performed by: INTERNAL MEDICINE

## 2017-05-27 PROCEDURE — A9270 NON-COVERED ITEM OR SERVICE: HCPCS | Performed by: INTERNAL MEDICINE

## 2017-05-27 PROCEDURE — 83735 ASSAY OF MAGNESIUM: CPT

## 2017-05-27 RX ORDER — ONDANSETRON 4 MG/1
4 TABLET, ORALLY DISINTEGRATING ORAL EVERY 8 HOURS PRN
Qty: 20 TAB | Refills: 0 | Status: SHIPPED | OUTPATIENT
Start: 2017-05-27

## 2017-05-27 RX ORDER — NICOTINE 21 MG/24HR
1 PATCH, TRANSDERMAL 24 HOURS TRANSDERMAL EVERY 24 HOURS
Qty: 30 PATCH | Refills: 0 | Status: SHIPPED | OUTPATIENT
Start: 2017-05-27

## 2017-05-27 RX ORDER — LOPERAMIDE HYDROCHLORIDE 2 MG/1
2 CAPSULE ORAL 4 TIMES DAILY PRN
Status: DISCONTINUED | OUTPATIENT
Start: 2017-05-27 | End: 2017-05-27 | Stop reason: HOSPADM

## 2017-05-27 RX ORDER — MAGNESIUM SULFATE HEPTAHYDRATE 40 MG/ML
2 INJECTION, SOLUTION INTRAVENOUS ONCE
Status: COMPLETED | OUTPATIENT
Start: 2017-05-27 | End: 2017-05-27

## 2017-05-27 RX ORDER — LOPERAMIDE HYDROCHLORIDE 2 MG/1
2 CAPSULE ORAL 4 TIMES DAILY PRN
Qty: 30 CAP | Refills: 0 | Status: SHIPPED | OUTPATIENT
Start: 2017-05-27

## 2017-05-27 RX ADMIN — MAGNESIUM SULFATE IN WATER 2 G: 40 INJECTION, SOLUTION INTRAVENOUS at 10:50

## 2017-05-27 RX ADMIN — SODIUM CHLORIDE: 9 INJECTION, SOLUTION INTRAVENOUS at 06:05

## 2017-05-27 RX ADMIN — LOPERAMIDE HYDROCHLORIDE 2 MG: 2 CAPSULE ORAL at 12:24

## 2017-05-27 RX ADMIN — ACETAMINOPHEN 650 MG: 325 TABLET, FILM COATED ORAL at 06:08

## 2017-05-27 RX ADMIN — HEPARIN SODIUM 5000 UNITS: 5000 INJECTION, SOLUTION INTRAVENOUS; SUBCUTANEOUS at 06:05

## 2017-05-27 ASSESSMENT — LIFESTYLE VARIABLES: EVER_SMOKED: YES

## 2017-05-27 ASSESSMENT — PAIN SCALES - GENERAL: PAINLEVEL_OUTOF10: 3

## 2017-05-27 NOTE — PROGRESS NOTES
aao x 4. Jeanette. Denies n/v upon arrival. C/o HA, had pain med in ER, quiet environment provided. Admit profile completed. IVf running. poc discussed with pt. Pt on isolation to r/o c.diff. Call light within reach. Hourly rounding in use

## 2017-05-27 NOTE — DISCHARGE PLANNING
Pt would like to speak with someone for assistance with Medicaid application. Email sent to [facility] [place].    Care Transition Team Assessment    Information Source  Orientation : Oriented x 4  Information Given By: Patient  Who is responsible for making decisions for patient? : Patient    Readmission Evaluation  Is this a readmission?: No    Elopement Risk  Legal Hold: No    Interdisciplinary Discharge Planning  Does Admitting Nurse Feel This Could be a Complex Discharge?: No  Primary Care Physician: Jon MUNGUIA  Lives with - Patient's Self Care Capacity: Significant Other  Support Systems: Spouse / Significant Other  Housing / Facility: Other (Comments) (boarding house, first floor)  Do You Take your Prescribed Medications Regularly: Yes  Able to Return to Previous ADL's: Yes  Mobility Issues: No  Prior Services: None  Assistance Needed: No  Durable Medical Equipment: Not Applicable    Discharge Preparedness  Prior Functional Level: Independent with Activities of Daily Living  Difficulity with ADLs: None  Difficulity with IADLs: None    Functional Assesment  Prior Functional Level: Independent with Activities of Daily Living    Finances  Financial Barriers to Discharge: No  Prescription Coverage: Yes                             Discharge Risks or Barriers  Discharge risks or barriers?: Uninsured / underinsured  Patient risk factors: Uninsured or underinsured    Anticipated Discharge Information  Anticipated discharge disposition: Home  Discharge Address: facesheet verified

## 2017-05-27 NOTE — ED NOTES
"Med rec completed per pt at bedside  Allergies reviewed  Pt finished a 10 day course of Amoxicillin >2 weeks   Ago for a \"tooth infection\"  "

## 2017-05-27 NOTE — DISCHARGE INSTRUCTIONS
Discharge Instructions    Discharged to home by car with relative. Discharged via walking, hospital escort: Yes.  Special equipment needed: Not Applicable    Be sure to schedule a follow-up appointment with your primary care doctor or any specialists as instructed.     Discharge Plan:   Diet Plan: Discussed  Activity Level: Discussed  Smoking Cessation Offered: Patient Refused  Confirmed Follow up Appointment: Patient to Call and Schedule Appointment  Confirmed Symptoms Management: Discussed  Medication Reconciliation Updated: Yes  Influenza Vaccine Indication: Patient Refuses, Indicated: Not available from distributor/    I understand that a diet low in cholesterol, fat, and sodium is recommended for good health. Unless I have been given specific instructions below for another diet, I accept this instruction as my diet prescription.   Other diet: Regular Diet    Special Instructions: None    · Is patient discharged on Warfarin / Coumadin?   No     · Is patient Post Blood Transfusion?  No    Depression / Suicide Risk    As you are discharged from this [facility] facility, it is important to learn how to keep safe from harming yourself.    Recognize the warning signs:  · Abrupt changes in personality, positive or negative- including increase in energy   · Giving away possessions  · Change in eating patterns- significant weight changes-  positive or negative  · Change in sleeping patterns- unable to sleep or sleeping all the time   · Unwillingness or inability to communicate  · Depression  · Unusual sadness, discouragement and loneliness  · Talk of wanting to die  · Neglect of personal appearance   · Rebelliousness- reckless behavior  · Withdrawal from people/activities they love  · Confusion- inability to concentrate     If you or a loved one observes any of these behaviors or has concerns about self-harm, here's what you can do:  · Talk about it- your feelings and reasons for harming  yourself  · Remove any means that you might use to hurt yourself (examples: pills, rope, extension cords, firearm)  · Get professional help from the community (Mental Health, Substance Abuse, psychological counseling)  · Do not be alone:Call your Safe Contact- someone whom you trust who will be there for you.  · Call your local CRISIS HOTLINE 587-4225 or 705-409-2673  · Call your local Children's Mobile Crisis Response Team [place] (668) 382-0872 or www.K[employer]  · Call the toll free National Suicide Prevention Hotlines   · National Suicide Prevention Lifeline 892-562-DUEB (6213)  · National Hope Line Network 800-SUICIDE (211-1707)

## 2017-05-27 NOTE — PROGRESS NOTES
Complain headache for not having caffeine this morning, discuss about clear liquid diet. Tylenol given this morning.

## 2017-05-27 NOTE — H&P
DATE OF ADMISSION:  05/26/2017    CHIEF COMPLAINT:  Nausea, vomiting, diarrhea, and weakness.    HISTORY OF PRESENT ILLNESS:  The patient is a 47-year-old female with a   history of bipolar disorder, anxiety disorder, personality disorder and ADHD.    She is a very poor historian.  She told me that over the last 2 days, she has   had nausea, vomiting and diarrhea, but no abdominal pain.  She said she might   have ate something odd, though she is not sure if she is the only one at her   house or in her [place], family and friends had these symptoms.  She has no   obvious sick contacts.  Of note, she apparently was taking antibiotics several   weeks ago and took amoxicillin for 10 days, finishes about 2 weeks ago.  Of   note, she says her p.o. intake has been quite poor.  She denies any new   over-the-counter medications.  She is not sure if she has blood in the stool.    She does endorse some mild chills.  She is overall quite a poor historian.    REVIEW OF SYSTEMS:  All other systems reviewed and negative.    In the ED, NS bolus and Zofran were given.    PAST MEDICAL HISTORY:  1. History of peptic ulcer disease.  2. Bipolar disorder.  3. Anxiety disorder.  4. Attention deficit hyperactivity disorder.  5. History of alcoholism.    PAST SURGICAL HISTORY:  Right knee repair.    FAMILY HISTORY:  Diabetes, skin cancers.    SOCIAL HISTORY:  No alcohol.  Smokes 1 pack a day of tobacco for the last 20   years.  I spent over 10 minutes counseling the patient the importance of   tobacco cessation, is not interested at this time, bill code [id number].  Endorses   marijuana use, but no other drugs.    ALLERGIES:  ASPIRIN, NSAIDS, TORADOL, BACTRIM, CLINDAMYCIN, HALDOL AND HAY   FEVER.    MEDICATIONS PRIOR TO ADMISSION:  1. Tylenol 1000 mg every 6 hours as needed for moderate pain.  2. Amoxicillin 500 mg t.i.d., she finishes approximately over 2 weeks ago.  3. Benadryl 50 mg tabs at bedtime as needed for sleep.    PHYSICAL  EXAMINATION:  VITAL SIGNS:  Temperature is 36.4, heart rate is 48-53, respiratory rate 13,   oxygen saturation 97% on room air, blood pressure 98/50.  GENERAL:  Patient has odd affect, somewhat a poor historian.  She appears much   older than stated age.  She is A and O x3, though.  HEENT:  Normocephalic, atraumatic.  Pupils are equal and reactive to light.    Extraocular muscles intact.  She has dry mucous membranes.  CARDIOVASCULAR:  Sinus bradycardia.  No murmurs, rubs, or gallops.    Nondisplaced PMI.  LUNGS:  Clear to auscultation bilaterally.  No use of accessory muscles.  ABDOMEN:  Soft, nontender, nondistended.  Normoactive bowel sounds.  No   hepatosplenomegaly.  EXTREMITIES:  No clubbing, cyanosis, or edema.  Warm peripherally.  NEUROLOGIC:  Intact sensation to soft touch, nonfocal.  MUSCULOSKELETAL:  Full range of motion in all extremities, 5/5 muscle strength   throughout.  She has limited range of motion of the extensor tendon of the   right thumb, which has been an old cut and issue.  PSYCHOLOGICAL:  Somewhat odd affect, but A and O x3.    LABORATORY DATA:  CBC remarkable for white blood cell 9.3, H and H 12.6 and   39.1, platelet count 226.  CMP:  Sodium 132, potassium 4.0, glucose 117, BUN   and creatinine 16 and 0.73, AST and ALT 15 and 8, lipase 5.    Beta HCG is negative for pregnancy.    IMAGING:  Abdominal x-ray, 1 view, no acute findings.    ASSESSMENT:  1. Nausea, vomiting, and diarrhea, likely viral gastroenteritis.  2. Recent antibiotic use, need to rule out Clostridium difficile.  3. Elevated blood sugar, likely reactive.  4. Attention deficit hyperactivity disorder.  5. Bipolar disorder.  6. History of alcohol abuse.  7. Ongoing marijuana abuse.  8. Personality disorder.    PLAN:  The patient will be admitted to the medical unit.  At this time, she   appears to have just nausea, vomiting, diarrhea with possible viral   gastroenteritis, deem to rule out C. difficile, given the recent  antibiotic   use.  Abdominal  x-ray is unremarkable.  We will do clear liquid diet,   supportive treatment.  No antibiotics at this time.  We will follow up on the   labs in the morning.    CODE STATUS:  Full code.    DIET:  Clear liquids.    Deep venous thrombosis prophylaxis, heparin 5000 units every 8 hours.       ____________________________________     MD RAMIN SCHUMACHER / KAREN    DD:  05/26/2017 23:30:11  DT:  05/27/2017 00:17:50    D#:  [id number]  Job#:  [id number]

## 2017-05-27 NOTE — ED NOTES
Pt transferred from [facility] to [place] [address] by gurney, per transport tech.  Pt A&OX4, denies pain, nausea. Report to OLIMPIA Whittington.

## 2017-05-28 NOTE — DISCHARGE SUMMARY
DATE OF ADMISSION:  05/26/2017    DATE OF DISCHARGE:  05/27/2017    PRIMARY CARE PROVIDER:  JIMBO Soriano    CONSULTS:  None.    PROCEDURES:  None.    DISCHARGE DIAGNOSIS:  Viral gastroenteritis.    CHRONIC MEDICAL PROBLEMS:  Bipolar 2 disorder, attention deficit hyperactivity   disorder, tobacco dependence, history of methamphetamine abuse.    RESULTS REVIEW:  Interval history/exam for day of discharge:    Filed Vitals:    05/26/17 2111 05/27/17 0010 05/27/17 0445 05/27/17 0802   BP: 98/50 94/70 106/59 111/67   Pulse: 53 61 59 60   Temp: 37.1 °C (98.7 °F) 36.7 °C (98.1 °F) 36.8 °C (98.3 °F) 36.7 °C (98 °F)   Resp:  16 16 18   Height:       Weight:       SpO2: 97% 97% 98% 99%     Weight/BMI: Body mass index is 31.89 kg/(m^2).         Most Recent Labs:    Lab Results   Component Value Date/Time    WBC 5.6 05/27/2017 04:50 AM    RBC 3.68* 05/27/2017 04:50 AM    HEMOGLOBIN 11.5* 05/27/2017 04:50 AM    HEMATOCRIT 38.5 05/27/2017 04:50 AM    .6* 05/27/2017 04:50 AM    MCH 31.3 05/27/2017 04:50 AM    MCHC 29.9* 05/27/2017 04:50 AM    MPV 11.0 05/27/2017 04:50 AM    NEUTROPHILS-POLYS 44.80 05/27/2017 04:50 AM    LYMPHOCYTES 46.00* 05/27/2017 04:50 AM    MONOCYTES 6.90 05/27/2017 04:50 AM    EOSINOPHILS 1.40 05/27/2017 04:50 AM    BASOPHILS 0.50 05/27/2017 04:50 AM      Lab Results   Component Value Date/Time    SODIUM 134* 05/27/2017 04:50 AM    POTASSIUM 3.8 05/27/2017 04:50 AM    CHLORIDE 110 05/27/2017 04:50 AM    CO2 18* 05/27/2017 04:50 AM    GLUCOSE 90 05/27/2017 04:50 AM    BUN 13 05/27/2017 04:50 AM    CREATININE 0.63 05/27/2017 04:50 AM      Lab Results   Component Value Date/Time    ALT(SGPT) 8 05/26/2017 03:50 PM    AST(SGOT) 15 05/26/2017 03:50 PM    ALKALINE PHOSPHATASE 47 05/26/2017 03:50 PM    TOTAL BILIRUBIN 0.3 05/26/2017 03:50 PM    LIPASE 5* 05/26/2017 03:50 PM    ALBUMIN 3.5 05/26/2017 03:50 PM    GLOBULIN 3.6* 05/26/2017 03:50 PM    INR 0.91 02/09/2013 10:00 AM     Lab Results   Component  Value Date/Time    PT 12.5 02/09/2013 10:00 AM    INR 0.91 02/09/2013 10:00 AM        Imaging/ Testing:      [facility]-MOLIRTA-7 VIEW   Final Result      No acute findings.                MEDICATIONS:     Medication List      START taking these medications       Instructions    loperamide 2 MG Caps   Last time this was given:  2 mg on 5/27/2017 12:24 PM   Commonly known as:  IMODIUM    Take 1 Cap by mouth 4 times a day as needed for Diarrhea.   Dose:  2 mg       nicotine 21 MG/24HR Pt24   Last time this was given:  21 mg on 5/26/2017 10:20 PM   Commonly known as:  NICODERM    Apply 1 Patch to skin as directed every 24 hours.   Dose:  1 Patch       ondansetron 4 MG Tbdp   Commonly known as:  ZOFRAN ODT    Take 1 Tab by mouth every 8 hours as needed for Nausea/Vomiting.   Dose:  4 mg         CONTINUE taking these medications       Instructions    acetaminophen 500 MG Tabs   Last time this was given:  650 mg on 5/27/2017  6:08 AM   Commonly known as:  TYLENOL    Take 1,000 mg by mouth every 6 hours as needed for Moderate Pain.   Dose:  1000 mg       diphenhydrAMINE 25 MG Tabs   Commonly known as:  BENADRYL    Take 50 mg by mouth at bedtime as needed for Sleep.   Dose:  50 mg         STOP taking these medications          amoxicillin 500 MG Caps   Commonly known as:  AMOXIL               HOSPITAL COURSE AND DECISION MAKING:  Please see H and P dictated by Dr. Jens Hastings on May 26.  In brief, this is a 47-year-old female that presented   with nausea, vomiting, diarrhea but with no abdominal pain for 2 days.  She   overall was a poor historian, but did say that she had taken antibiotics for   10 days and ended 2 weeks prior.  A sample of her diarrhea was taken for C.   diff, which was negative.  She tolerated clear liquid and was advanced and   tolerated well.  Zofran worked well for her and she was given Imodium once her   C. diff was found to be negative.  She is hydrating by mouth well and will   follow up with her  primary care provider within 7-10 days.  She is given   Zofran for home and Imodium.  She will again advance diet as tolerated,   advance activities as tolerated and follow up with primary care.       ____________________________________     JIMBO Bowers / KAREN    DD:  05/27/2017 16:32:41  DT:  05/28/2017 08:55:30    D#:  [id number]  Job#:  [id number]

## 2017-05-29 LAB — EKG IMPRESSION: NORMAL

## 2017-06-05 ENCOUNTER — TELEPHONE (OUTPATIENT)
Dept: MEDICAL GROUP | Facility: MEDICAL CENTER | Age: 47
End: 2017-06-05

## 2017-07-10 ENCOUNTER — HOSPITAL ENCOUNTER (EMERGENCY)
Facility: HOSPITAL | Age: 47
Discharge: HOME | End: 2017-07-10
Attending: EMERGENCY MEDICINE | Admitting: EMERGENCY MEDICINE
Payer: MEDICARE

## 2017-07-10 ENCOUNTER — APPOINTMENT (EMERGENCY)
Dept: RADIOLOGY | Facility: HOSPITAL | Age: 47
End: 2017-07-10
Payer: MEDICARE

## 2017-07-10 VITALS
DIASTOLIC BLOOD PRESSURE: 67 MMHG | HEART RATE: 67 BPM | SYSTOLIC BLOOD PRESSURE: 133 MMHG | RESPIRATION RATE: 18 BRPM | BODY MASS INDEX: 33.31 KG/M2 | OXYGEN SATURATION: 98 % | HEIGHT: 63 IN | TEMPERATURE: 98.5 F | WEIGHT: 188 LBS

## 2017-07-10 DIAGNOSIS — R07.9 CHEST PAIN: Primary | ICD-10-CM

## 2017-07-10 LAB
ALBUMIN SERPL BCP-MCNC: 3.6 G/DL (ref 3.5–5)
ALP SERPL-CCNC: 59 U/L (ref 46–116)
ALT SERPL W P-5'-P-CCNC: 30 U/L (ref 12–78)
ANION GAP SERPL CALCULATED.3IONS-SCNC: 6 MMOL/L (ref 4–13)
AST SERPL W P-5'-P-CCNC: 11 U/L (ref 5–45)
BASOPHILS # BLD AUTO: 0.05 THOUSANDS/ΜL (ref 0–0.1)
BASOPHILS NFR BLD AUTO: 1 % (ref 0–1)
BILIRUB SERPL-MCNC: 0.26 MG/DL (ref 0.2–1)
BUN SERPL-MCNC: 15 MG/DL (ref 5–25)
CALCIUM SERPL-MCNC: 8.1 MG/DL (ref 8.3–10.1)
CHLORIDE SERPL-SCNC: 106 MMOL/L (ref 100–108)
CO2 SERPL-SCNC: 25 MMOL/L (ref 21–32)
CREAT SERPL-MCNC: 0.85 MG/DL (ref 0.6–1.3)
DEPRECATED D DIMER PPP: <220 NG/ML (FEU) (ref 0–424)
EOSINOPHIL # BLD AUTO: 0.15 THOUSAND/ΜL (ref 0–0.61)
EOSINOPHIL NFR BLD AUTO: 2 % (ref 0–6)
ERYTHROCYTE [DISTWIDTH] IN BLOOD BY AUTOMATED COUNT: 13.3 % (ref 11.6–15.1)
GFR SERPL CREATININE-BSD FRML MDRD: >60 ML/MIN/1.73SQ M
GLUCOSE SERPL-MCNC: 108 MG/DL (ref 65–140)
HCT VFR BLD AUTO: 38.3 % (ref 34.8–46.1)
HGB BLD-MCNC: 12.5 G/DL (ref 11.5–15.4)
LIPASE SERPL-CCNC: 112 U/L (ref 73–393)
LYMPHOCYTES # BLD AUTO: 2.79 THOUSANDS/ΜL (ref 0.6–4.47)
LYMPHOCYTES NFR BLD AUTO: 43 % (ref 14–44)
MCH RBC QN AUTO: 30.7 PG (ref 26.8–34.3)
MCHC RBC AUTO-ENTMCNC: 32.6 G/DL (ref 31.4–37.4)
MCV RBC AUTO: 94 FL (ref 82–98)
MONOCYTES # BLD AUTO: 0.64 THOUSAND/ΜL (ref 0.17–1.22)
MONOCYTES NFR BLD AUTO: 10 % (ref 4–12)
NEUTROPHILS # BLD AUTO: 2.82 THOUSANDS/ΜL (ref 1.85–7.62)
NEUTS SEG NFR BLD AUTO: 44 % (ref 43–75)
NRBC BLD AUTO-RTO: 0 /100 WBCS
PLATELET # BLD AUTO: 260 THOUSANDS/UL (ref 149–390)
PMV BLD AUTO: 10.2 FL (ref 8.9–12.7)
POTASSIUM SERPL-SCNC: 4.2 MMOL/L (ref 3.5–5.3)
PROT SERPL-MCNC: 7.1 G/DL (ref 6.4–8.2)
RBC # BLD AUTO: 4.07 MILLION/UL (ref 3.81–5.12)
SODIUM SERPL-SCNC: 137 MMOL/L (ref 136–145)
SPECIMEN SOURCE: NORMAL
SPECIMEN SOURCE: NORMAL
TROPONIN I BLD-MCNC: 0 NG/ML (ref 0–0.08)
TROPONIN I BLD-MCNC: 0 NG/ML (ref 0–0.08)
WBC # BLD AUTO: 6.46 THOUSAND/UL (ref 4.31–10.16)

## 2017-07-10 PROCEDURE — 36415 COLL VENOUS BLD VENIPUNCTURE: CPT | Performed by: EMERGENCY MEDICINE

## 2017-07-10 PROCEDURE — 83690 ASSAY OF LIPASE: CPT | Performed by: EMERGENCY MEDICINE

## 2017-07-10 PROCEDURE — 84484 ASSAY OF TROPONIN QUANT: CPT

## 2017-07-10 PROCEDURE — 99285 EMERGENCY DEPT VISIT HI MDM: CPT

## 2017-07-10 PROCEDURE — 93005 ELECTROCARDIOGRAM TRACING: CPT | Performed by: EMERGENCY MEDICINE

## 2017-07-10 PROCEDURE — 71020 HB CHEST X-RAY 2VW FRONTAL&LATL: CPT

## 2017-07-10 PROCEDURE — 85025 COMPLETE CBC W/AUTO DIFF WBC: CPT | Performed by: EMERGENCY MEDICINE

## 2017-07-10 PROCEDURE — 85379 FIBRIN DEGRADATION QUANT: CPT | Performed by: EMERGENCY MEDICINE

## 2017-07-10 PROCEDURE — 80053 COMPREHEN METABOLIC PANEL: CPT | Performed by: EMERGENCY MEDICINE

## 2017-07-11 ENCOUNTER — HOSPITAL ENCOUNTER (OUTPATIENT)
Dept: NON INVASIVE DIAGNOSTICS | Facility: HOSPITAL | Age: 47
Discharge: HOME | End: 2017-07-11
Attending: EMERGENCY MEDICINE
Payer: MEDICARE

## 2017-07-11 DIAGNOSIS — R07.9 CHEST PAIN: ICD-10-CM

## 2017-07-11 LAB
ATRIAL RATE: 56 BPM
ATRIAL RATE: 78 BPM
CHEST PAIN STATEMENT: NORMAL
MAX DIASTOLIC BP: 70 MMHG
MAX HEART RATE: 151 BPM
MAX PREDICTED HEART RATE: 175 BPM
MAX. SYSTOLIC BP: 162 MMHG
P AXIS: 39 DEGREES
P AXIS: 76 DEGREES
PR INTERVAL: 138 MS
PR INTERVAL: 152 MS
PROTOCOL NAME: NORMAL
QRS AXIS: -2 DEGREES
QRS AXIS: 105 DEGREES
QRSD INTERVAL: 76 MS
QRSD INTERVAL: 78 MS
QT INTERVAL: 404 MS
QT INTERVAL: 430 MS
QTC INTERVAL: 414 MS
QTC INTERVAL: 460 MS
REASON FOR TERMINATION: NORMAL
T WAVE AXIS: 22 DEGREES
T WAVE AXIS: 77 DEGREES
TARGET HR FORMULA: NORMAL
TEST INDICATION: NORMAL
TIME IN EXERCISE PHASE: 488 S
VENTRICULAR RATE: 56 BPM
VENTRICULAR RATE: 78 BPM

## 2017-07-11 PROCEDURE — 93350 STRESS TTE ONLY: CPT

## 2018-05-06 ENCOUNTER — HOSPITAL ENCOUNTER (EMERGENCY)
Facility: MEDICAL CENTER | Age: 48
End: 2018-05-06
Attending: EMERGENCY MEDICINE
Payer: MEDICARE

## 2018-05-06 VITALS
HEIGHT: 63 IN | DIASTOLIC BLOOD PRESSURE: 95 MMHG | SYSTOLIC BLOOD PRESSURE: 154 MMHG | HEART RATE: 85 BPM | WEIGHT: 194 LBS | BODY MASS INDEX: 34.38 KG/M2 | RESPIRATION RATE: 16 BRPM | TEMPERATURE: 98.2 F | OXYGEN SATURATION: 98 %

## 2018-05-06 DIAGNOSIS — K08.9 CHRONIC DENTAL PAIN: ICD-10-CM

## 2018-05-06 DIAGNOSIS — G89.29 CHRONIC DENTAL PAIN: ICD-10-CM

## 2018-05-06 PROCEDURE — 99283 EMERGENCY DEPT VISIT LOW MDM: CPT

## 2018-05-06 RX ORDER — FLUCONAZOLE 150 MG/1
150 TABLET ORAL DAILY
Qty: 1 TAB | Refills: 0 | Status: SHIPPED | OUTPATIENT
Start: 2018-05-06

## 2018-05-06 RX ORDER — PENICILLIN V POTASSIUM 500 MG/1
500 TABLET ORAL ONCE
Status: DISCONTINUED | OUTPATIENT
Start: 2018-05-06 | End: 2018-05-06 | Stop reason: HOSPADM

## 2018-05-06 RX ORDER — PENICILLIN V POTASSIUM 500 MG/1
500 TABLET ORAL 4 TIMES DAILY
Qty: 40 TAB | Refills: 0 | Status: SHIPPED | OUTPATIENT
Start: 2018-05-06

## 2018-05-06 ASSESSMENT — PAIN SCALES - GENERAL: PAINLEVEL_OUTOF10: 8

## 2018-05-06 NOTE — ED NOTES
"Chief Complaint   Patient presents with   • Dental Pain     per pt \"I have multiple abscesses\"      /95   Pulse 85   Temp 36.8 °C (98.2 °F)   Resp 16   Ht 1.6 m (5' 3\")   Wt 88 kg (194 lb 0.1 oz)   LMP 05/05/2018   SpO2 98%   BMI 34.37 kg/m²       "

## 2018-05-06 NOTE — ED PROVIDER NOTES
"ED Provider Note    CHIEF COMPLAINT  Chief Complaint   Patient presents with   • Dental Pain     per pt \"I have multiple abscesses\"        HPI  Karrie Ann Riedel is a 47 y.o. female here for evaluation of dental pain. The patient states that she is a lengthy history of poor dentition, and recently moved back here from out of town. She has an appointment with her dentist tomorrow, but her sister wanted her to come in and start antibiotics today. She has no fever, no vomiting, and no other medical complaints. She has some swelling to the gums, but is able to eat and swallow without difficulty. She does have multiple missing teeth secondary to some extractions that she had a long time ago.    PAST MEDICAL HISTORY   has a past medical history of ADHD (attention deficit hyperactivity disorder); Anesthesia; Anxiety; Arthritis; Breath shortness; Bronchitis (2011); Bronchitis; Chiari malformation; Dental disorder; Depression; Hand swelling; Heart burn; Hepatitis A (1984); Hyponatremia; Indigestion; Knee pain; LBP RADIATING TO BOTH LEGS; and Personality disorder.    SOCIAL HISTORY  Social History     Social History Main Topics   • Smoking status: Current Every Day Smoker     Packs/day: 0.50     Years: 19.00     Types: Cigarettes     Last attempt to quit: 8/9/2012   • Smokeless tobacco: Former User     Quit date: 1/1/1990      Comment: 2 TO 0.5 PPD  FOR 28 YEARS     • Alcohol use No      Comment: QUIT 5 YEARS AGO    • Drug use: Yes     Types: Marijuana      Comment: Marijuana   • Sexual activity: Yes     Partners: Female       SURGICAL HISTORY   has a past surgical history that includes ear middle exploration; tubal coagulation laparoscopic bilateral; knee arthroscopy; ankle orif (7/23/2010); orthopedic osteotomy (3/24/2011); hardware removal ortho; hardware removal ortho (7/5/2012); and gastroscopy-endo (8/17/2012).    CURRENT MEDICATIONS  Home Medications    **Home medications have not yet been reviewed for this " "encounter**         ALLERGIES  Allergies   Allergen Reactions   • Aspirin Hives     hives   • Nsaids Hives     Hives-ALL EXCEPT TYLENOL   • Toradol Hives   • Bactrim [Sulfamethoxazole W-Trimethoprim]    • Clindamycin    • Haldol [Haloperidol Lactate]    • Other Environmental      Pollen-hayfever       REVIEW OF SYSTEMS  See HPI for further details. Review of systems as above, otherwise all other systems are negative.     PHYSICAL EXAM  VITAL SIGNS: /95   Pulse 85   Temp 36.8 °C (98.2 °F)   Resp 16   Ht 1.6 m (5' 3\")   Wt 88 kg (194 lb 0.1 oz)   LMP 05/05/2018   SpO2 98%   BMI 34.37 kg/m²     Constitutional: Well developed, well nourished. No acute distress.  HEENT: Normocephalic, atraumatic. MMM. Poor dentition, no abscess, no trismus  Neck: Supple, Full range of motion   Chest/Pulmonary:  No respiratory distress.  Equal expansion   Musculoskeletal: No deformity, no edema, neurovascular intact.   Neuro: Clear speech, appropriate, cooperative, cranial nerves II-XII grossly intact.  Psych: Normal mood and affect      PROCEDURES     MEDICAL RECORD  I have reviewed patient's medical record and pertinent results are listed above.    COURSE & MEDICAL DECISION MAKING  I have reviewed any medical record information, laboratory studies and radiographic results as noted above.    2:45 PM  At this time, the patient is nontoxic appearing, able to eat and drink without difficulty, and will be treated with antibiotics here prior to dental visit tomorrow. She has multiple missing and cracked teeth, and this is not new. She will return here for any further issues or concerns. Patient requests Diflucan secondary to frequent yeast infections with antibiotics.    I you have had any blood pressure issues while here in the emergency department, please see your doctor for a further evaluation or work up.    Differential diagnoses include but not limited to: Abscess versus fracture    This patient presents with poor " dentition and dental caries .  At this time, I have counseled the patient/family regarding their medications, pain control, and follow up.  They will continue their medications, if any, as prescribed.  They will return immediately for any worsening symptoms and/or any other medical concerns.  They will see their doctor, or contact the doctor provided, in 1-2 days for follow up.       FINAL IMPRESSION  1. Chronic dental pain            Electronically signed by: Favian Osorio, 5/6/2018 2:36 PM

## 2018-05-06 NOTE — DISCHARGE INSTRUCTIONS
Dental Care and Dentist Visits  Dental care supports good overall health. Regular dental visits can also help you avoid dental pain, bleeding, infection, and other more serious health problems in the future. It is important to keep the mouth healthy because diseases in the teeth, gums, and other oral tissues can spread to other areas of the body. Some problems, such as diabetes, heart disease, and pre-term labor have been associated with poor oral health.   See your dentist every 6 months. If you experience emergency problems such as a toothache or broken tooth, go to the dentist right away. If you see your dentist regularly, you may catch problems early. It is easier to be treated for problems in the early stages.   WHAT TO EXPECT AT A DENTIST VISIT   Your dentist will look for many common oral health problems and recommend proper treatment. At your regular dental visit, you can expect:  · Gentle cleaning of the teeth and gums. This includes scraping and polishing. This helps to remove the sticky substance around the teeth and gums (plaque). Plaque forms in the mouth shortly after eating. Over time, plaque hardens on the teeth as tartar. If tartar is not removed regularly, it can cause problems. Cleaning also helps remove stains.  · Periodic X-rays. These pictures of the teeth and supporting bone will help your dentist assess the health of your teeth.  · Periodic fluoride treatments. Fluoride is a natural mineral shown to help strengthen teeth. Fluoride treatment involves applying a fluoride gel or varnish to the teeth. It is most commonly done in children.  · Examination of the mouth, tongue, jaws, teeth, and gums to look for any oral health problems, such as:  ¨ Cavities (dental caries). This is decay on the tooth caused by plaque, sugar, and acid in the mouth. It is best to catch a cavity when it is small.  ¨ Inflammation of the gums caused by plaque buildup (gingivitis).  ¨ Problems with the mouth or malformed  or misaligned teeth.  ¨ Oral cancer or other diseases of the soft tissues or jaws.   KEEP YOUR TEETH AND GUMS HEALTHY  For healthy teeth and gums, follow these general guidelines as well as your dentist's specific advice:  · Have your teeth professionally cleaned at the dentist every 6 months.  · Brush twice daily with a fluoride toothpaste.  · Floss your teeth daily.   · Ask your dentist if you need fluoride supplements, treatments, or fluoride toothpaste.  · Eat a healthy diet. Reduce foods and drinks with added sugar.  · Avoid smoking.  TREATMENT FOR ORAL HEALTH PROBLEMS  If you have oral health problems, treatment varies depending on the conditions present in your teeth and gums.  · Your caregiver will most likely recommend good oral hygiene at each visit.  · For cavities, gingivitis, or other oral health disease, your caregiver will perform a procedure to treat the problem. This is typically done at a separate appointment. Sometimes your caregiver will refer you to another dental specialist for specific tooth problems or for surgery.  SEEK IMMEDIATE DENTAL CARE IF:  · You have pain, bleeding, or soreness in the gum, tooth, jaw, or mouth area.  · A permanent tooth becomes loose or  from the gum socket.  · You experience a blow or injury to the mouth or jaw area.     This information is not intended to replace advice given to you by your health care provider. Make sure you discuss any questions you have with your health care provider.     Document Released: 08/29/2012 Document Revised: 03/11/2013 Document Reviewed: 08/29/2012  [employer] Interactive Patient Education ©2016 [employer]